# Patient Record
Sex: FEMALE | Race: ASIAN | NOT HISPANIC OR LATINO | URBAN - METROPOLITAN AREA
[De-identification: names, ages, dates, MRNs, and addresses within clinical notes are randomized per-mention and may not be internally consistent; named-entity substitution may affect disease eponyms.]

---

## 2023-06-04 ENCOUNTER — INPATIENT (INPATIENT)
Facility: HOSPITAL | Age: 43
LOS: 2 days | Discharge: LEFT BEFORE DISCHARGE | End: 2023-06-07
Attending: STUDENT IN AN ORGANIZED HEALTH CARE EDUCATION/TRAINING PROGRAM | Admitting: STUDENT IN AN ORGANIZED HEALTH CARE EDUCATION/TRAINING PROGRAM
Payer: COMMERCIAL

## 2023-06-04 VITALS
OXYGEN SATURATION: 100 % | DIASTOLIC BLOOD PRESSURE: 113 MMHG | TEMPERATURE: 98 F | SYSTOLIC BLOOD PRESSURE: 182 MMHG | HEART RATE: 89 BPM | RESPIRATION RATE: 16 BRPM

## 2023-06-04 DIAGNOSIS — Z29.9 ENCOUNTER FOR PROPHYLACTIC MEASURES, UNSPECIFIED: ICD-10-CM

## 2023-06-04 DIAGNOSIS — I82.409 ACUTE EMBOLISM AND THROMBOSIS OF UNSPECIFIED DEEP VEINS OF UNSPECIFIED LOWER EXTREMITY: ICD-10-CM

## 2023-06-04 DIAGNOSIS — I10 ESSENTIAL (PRIMARY) HYPERTENSION: ICD-10-CM

## 2023-06-04 DIAGNOSIS — R55 SYNCOPE AND COLLAPSE: ICD-10-CM

## 2023-06-04 DIAGNOSIS — E11.65 TYPE 2 DIABETES MELLITUS WITH HYPERGLYCEMIA: ICD-10-CM

## 2023-06-04 DIAGNOSIS — I51.81 TAKOTSUBO SYNDROME: ICD-10-CM

## 2023-06-04 DIAGNOSIS — R11.2 NAUSEA WITH VOMITING, UNSPECIFIED: ICD-10-CM

## 2023-06-04 LAB
A1C WITH ESTIMATED AVERAGE GLUCOSE RESULT: 12.8 % — HIGH (ref 4–5.6)
ALBUMIN SERPL ELPH-MCNC: 4.3 G/DL — SIGNIFICANT CHANGE UP (ref 3.3–5)
ALP SERPL-CCNC: 102 U/L — SIGNIFICANT CHANGE UP (ref 40–120)
ALT FLD-CCNC: 17 U/L — SIGNIFICANT CHANGE UP (ref 4–33)
ANION GAP SERPL CALC-SCNC: 14 MMOL/L — SIGNIFICANT CHANGE UP (ref 7–14)
APPEARANCE UR: CLEAR — SIGNIFICANT CHANGE UP
AST SERPL-CCNC: 19 U/L — SIGNIFICANT CHANGE UP (ref 4–32)
B-OH-BUTYR SERPL-SCNC: 0.5 MMOL/L — HIGH (ref 0–0.4)
BASOPHILS # BLD AUTO: 0.01 K/UL — SIGNIFICANT CHANGE UP (ref 0–0.2)
BASOPHILS NFR BLD AUTO: 0.1 % — SIGNIFICANT CHANGE UP (ref 0–2)
BILIRUB SERPL-MCNC: 0.4 MG/DL — SIGNIFICANT CHANGE UP (ref 0.2–1.2)
BILIRUB UR-MCNC: NEGATIVE — SIGNIFICANT CHANGE UP
BLOOD GAS VENOUS COMPREHENSIVE RESULT: SIGNIFICANT CHANGE UP
BUN SERPL-MCNC: 16 MG/DL — SIGNIFICANT CHANGE UP (ref 7–23)
CALCIUM SERPL-MCNC: 9.7 MG/DL — SIGNIFICANT CHANGE UP (ref 8.4–10.5)
CHLORIDE SERPL-SCNC: 96 MMOL/L — LOW (ref 98–107)
CO2 SERPL-SCNC: 23 MMOL/L — SIGNIFICANT CHANGE UP (ref 22–31)
COLOR SPEC: COLORLESS — SIGNIFICANT CHANGE UP
CREAT SERPL-MCNC: 0.53 MG/DL — SIGNIFICANT CHANGE UP (ref 0.5–1.3)
DIFF PNL FLD: NEGATIVE — SIGNIFICANT CHANGE UP
EGFR: 118 ML/MIN/1.73M2 — SIGNIFICANT CHANGE UP
EOSINOPHIL # BLD AUTO: 0.17 K/UL — SIGNIFICANT CHANGE UP (ref 0–0.5)
EOSINOPHIL NFR BLD AUTO: 2.3 % — SIGNIFICANT CHANGE UP (ref 0–6)
ESTIMATED AVERAGE GLUCOSE: 321 — SIGNIFICANT CHANGE UP
GLUCOSE BLDC GLUCOMTR-MCNC: 254 MG/DL — HIGH (ref 70–99)
GLUCOSE BLDC GLUCOMTR-MCNC: 262 MG/DL — HIGH (ref 70–99)
GLUCOSE BLDC GLUCOMTR-MCNC: 286 MG/DL — HIGH (ref 70–99)
GLUCOSE SERPL-MCNC: 380 MG/DL — HIGH (ref 70–99)
GLUCOSE UR QL: ABNORMAL
HCG SERPL-ACNC: <1 MIU/ML — SIGNIFICANT CHANGE UP
HCT VFR BLD CALC: 39.2 % — SIGNIFICANT CHANGE UP (ref 34.5–45)
HGB BLD-MCNC: 13.2 G/DL — SIGNIFICANT CHANGE UP (ref 11.5–15.5)
IANC: 4.9 K/UL — SIGNIFICANT CHANGE UP (ref 1.8–7.4)
IMM GRANULOCYTES NFR BLD AUTO: 0.3 % — SIGNIFICANT CHANGE UP (ref 0–0.9)
KETONES UR-MCNC: ABNORMAL
LEUKOCYTE ESTERASE UR-ACNC: NEGATIVE — SIGNIFICANT CHANGE UP
LYMPHOCYTES # BLD AUTO: 1.86 K/UL — SIGNIFICANT CHANGE UP (ref 1–3.3)
LYMPHOCYTES # BLD AUTO: 25 % — SIGNIFICANT CHANGE UP (ref 13–44)
MAGNESIUM SERPL-MCNC: 1.5 MG/DL — LOW (ref 1.6–2.6)
MCHC RBC-ENTMCNC: 28.4 PG — SIGNIFICANT CHANGE UP (ref 27–34)
MCHC RBC-ENTMCNC: 33.7 GM/DL — SIGNIFICANT CHANGE UP (ref 32–36)
MCV RBC AUTO: 84.3 FL — SIGNIFICANT CHANGE UP (ref 80–100)
MONOCYTES # BLD AUTO: 0.49 K/UL — SIGNIFICANT CHANGE UP (ref 0–0.9)
MONOCYTES NFR BLD AUTO: 6.6 % — SIGNIFICANT CHANGE UP (ref 2–14)
NEUTROPHILS # BLD AUTO: 4.9 K/UL — SIGNIFICANT CHANGE UP (ref 1.8–7.4)
NEUTROPHILS NFR BLD AUTO: 65.7 % — SIGNIFICANT CHANGE UP (ref 43–77)
NITRITE UR-MCNC: NEGATIVE — SIGNIFICANT CHANGE UP
NRBC # BLD: 0 /100 WBCS — SIGNIFICANT CHANGE UP (ref 0–0)
NRBC # FLD: 0 K/UL — SIGNIFICANT CHANGE UP (ref 0–0)
PH UR: 7.5 — SIGNIFICANT CHANGE UP (ref 5–8)
PHOSPHATE SERPL-MCNC: 2.7 MG/DL — SIGNIFICANT CHANGE UP (ref 2.5–4.5)
PLATELET # BLD AUTO: 267 K/UL — SIGNIFICANT CHANGE UP (ref 150–400)
POTASSIUM SERPL-MCNC: 4.6 MMOL/L — SIGNIFICANT CHANGE UP (ref 3.5–5.3)
POTASSIUM SERPL-SCNC: 4.6 MMOL/L — SIGNIFICANT CHANGE UP (ref 3.5–5.3)
PROT SERPL-MCNC: 8.3 G/DL — SIGNIFICANT CHANGE UP (ref 6–8.3)
PROT UR-MCNC: ABNORMAL
RAPID RVP RESULT: SIGNIFICANT CHANGE UP
RBC # BLD: 4.65 M/UL — SIGNIFICANT CHANGE UP (ref 3.8–5.2)
RBC # FLD: 12.3 % — SIGNIFICANT CHANGE UP (ref 10.3–14.5)
SARS-COV-2 RNA SPEC QL NAA+PROBE: SIGNIFICANT CHANGE UP
SODIUM SERPL-SCNC: 133 MMOL/L — LOW (ref 135–145)
SP GR SPEC: 1.02 — SIGNIFICANT CHANGE UP (ref 1.01–1.05)
TROPONIN T, HIGH SENSITIVITY RESULT: <6 NG/L — SIGNIFICANT CHANGE UP
UROBILINOGEN FLD QL: SIGNIFICANT CHANGE UP
WBC # BLD: 7.45 K/UL — SIGNIFICANT CHANGE UP (ref 3.8–10.5)
WBC # FLD AUTO: 7.45 K/UL — SIGNIFICANT CHANGE UP (ref 3.8–10.5)

## 2023-06-04 PROCEDURE — 99285 EMERGENCY DEPT VISIT HI MDM: CPT

## 2023-06-04 PROCEDURE — 71045 X-RAY EXAM CHEST 1 VIEW: CPT | Mod: 26

## 2023-06-04 PROCEDURE — 99223 1ST HOSP IP/OBS HIGH 75: CPT | Mod: GC

## 2023-06-04 RX ORDER — INSULIN LISPRO 100/ML
VIAL (ML) SUBCUTANEOUS
Refills: 0 | Status: DISCONTINUED | OUTPATIENT
Start: 2023-06-04 | End: 2023-06-07

## 2023-06-04 RX ORDER — DEXTROSE 50 % IN WATER 50 %
25 SYRINGE (ML) INTRAVENOUS ONCE
Refills: 0 | Status: DISCONTINUED | OUTPATIENT
Start: 2023-06-04 | End: 2023-06-07

## 2023-06-04 RX ORDER — INSULIN LISPRO 100/ML
VIAL (ML) SUBCUTANEOUS AT BEDTIME
Refills: 0 | Status: DISCONTINUED | OUTPATIENT
Start: 2023-06-04 | End: 2023-06-07

## 2023-06-04 RX ORDER — FAMOTIDINE 10 MG/ML
20 INJECTION INTRAVENOUS
Refills: 0 | Status: DISCONTINUED | OUTPATIENT
Start: 2023-06-04 | End: 2023-06-07

## 2023-06-04 RX ORDER — LOSARTAN POTASSIUM 100 MG/1
50 TABLET, FILM COATED ORAL DAILY
Refills: 0 | Status: DISCONTINUED | OUTPATIENT
Start: 2023-06-04 | End: 2023-06-05

## 2023-06-04 RX ORDER — DEXTROSE 50 % IN WATER 50 %
15 SYRINGE (ML) INTRAVENOUS ONCE
Refills: 0 | Status: DISCONTINUED | OUTPATIENT
Start: 2023-06-04 | End: 2023-06-07

## 2023-06-04 RX ORDER — SODIUM CHLORIDE 9 MG/ML
1000 INJECTION, SOLUTION INTRAVENOUS
Refills: 0 | Status: DISCONTINUED | OUTPATIENT
Start: 2023-06-04 | End: 2023-06-07

## 2023-06-04 RX ORDER — INSULIN GLARGINE 100 [IU]/ML
10 INJECTION, SOLUTION SUBCUTANEOUS AT BEDTIME
Refills: 0 | Status: DISCONTINUED | OUTPATIENT
Start: 2023-06-04 | End: 2023-06-05

## 2023-06-04 RX ORDER — SODIUM CHLORIDE 9 MG/ML
1000 INJECTION, SOLUTION INTRAVENOUS ONCE
Refills: 0 | Status: COMPLETED | OUTPATIENT
Start: 2023-06-04 | End: 2023-06-04

## 2023-06-04 RX ORDER — INSULIN LISPRO 100/ML
3 VIAL (ML) SUBCUTANEOUS
Refills: 0 | Status: DISCONTINUED | OUTPATIENT
Start: 2023-06-04 | End: 2023-06-05

## 2023-06-04 RX ORDER — METOCLOPRAMIDE HCL 10 MG
10 TABLET ORAL ONCE
Refills: 0 | Status: COMPLETED | OUTPATIENT
Start: 2023-06-04 | End: 2023-06-04

## 2023-06-04 RX ORDER — ACETAMINOPHEN 500 MG
650 TABLET ORAL EVERY 6 HOURS
Refills: 0 | Status: DISCONTINUED | OUTPATIENT
Start: 2023-06-04 | End: 2023-06-07

## 2023-06-04 RX ORDER — ONDANSETRON 8 MG/1
4 TABLET, FILM COATED ORAL EVERY 8 HOURS
Refills: 0 | Status: DISCONTINUED | OUTPATIENT
Start: 2023-06-04 | End: 2023-06-07

## 2023-06-04 RX ORDER — DEXTROSE 50 % IN WATER 50 %
12.5 SYRINGE (ML) INTRAVENOUS ONCE
Refills: 0 | Status: DISCONTINUED | OUTPATIENT
Start: 2023-06-04 | End: 2023-06-07

## 2023-06-04 RX ORDER — MAGNESIUM SULFATE 500 MG/ML
1 VIAL (ML) INJECTION ONCE
Refills: 0 | Status: COMPLETED | OUTPATIENT
Start: 2023-06-04 | End: 2023-06-04

## 2023-06-04 RX ORDER — ONDANSETRON 8 MG/1
4 TABLET, FILM COATED ORAL ONCE
Refills: 0 | Status: COMPLETED | OUTPATIENT
Start: 2023-06-04 | End: 2023-06-04

## 2023-06-04 RX ORDER — HEPARIN SODIUM 5000 [USP'U]/ML
5000 INJECTION INTRAVENOUS; SUBCUTANEOUS EVERY 8 HOURS
Refills: 0 | Status: DISCONTINUED | OUTPATIENT
Start: 2023-06-04 | End: 2023-06-07

## 2023-06-04 RX ORDER — GLUCAGON INJECTION, SOLUTION 0.5 MG/.1ML
1 INJECTION, SOLUTION SUBCUTANEOUS ONCE
Refills: 0 | Status: DISCONTINUED | OUTPATIENT
Start: 2023-06-04 | End: 2023-06-07

## 2023-06-04 RX ORDER — SODIUM CHLORIDE 9 MG/ML
1000 INJECTION, SOLUTION INTRAVENOUS
Refills: 0 | Status: DISCONTINUED | OUTPATIENT
Start: 2023-06-04 | End: 2023-06-05

## 2023-06-04 RX ORDER — LANOLIN ALCOHOL/MO/W.PET/CERES
3 CREAM (GRAM) TOPICAL AT BEDTIME
Refills: 0 | Status: DISCONTINUED | OUTPATIENT
Start: 2023-06-04 | End: 2023-06-07

## 2023-06-04 RX ORDER — FAMOTIDINE 10 MG/ML
20 INJECTION INTRAVENOUS ONCE
Refills: 0 | Status: COMPLETED | OUTPATIENT
Start: 2023-06-04 | End: 2023-06-04

## 2023-06-04 RX ADMIN — SODIUM CHLORIDE 1000 MILLILITER(S): 9 INJECTION, SOLUTION INTRAVENOUS at 16:37

## 2023-06-04 RX ADMIN — Medication 1: at 22:49

## 2023-06-04 RX ADMIN — ONDANSETRON 4 MILLIGRAM(S): 8 TABLET, FILM COATED ORAL at 10:29

## 2023-06-04 RX ADMIN — SODIUM CHLORIDE 1000 MILLILITER(S): 9 INJECTION, SOLUTION INTRAVENOUS at 10:29

## 2023-06-04 RX ADMIN — Medication 1 GRAM(S): at 16:37

## 2023-06-04 RX ADMIN — LOSARTAN POTASSIUM 50 MILLIGRAM(S): 100 TABLET, FILM COATED ORAL at 18:23

## 2023-06-04 RX ADMIN — INSULIN GLARGINE 10 UNIT(S): 100 INJECTION, SOLUTION SUBCUTANEOUS at 22:48

## 2023-06-04 RX ADMIN — ONDANSETRON 4 MILLIGRAM(S): 8 TABLET, FILM COATED ORAL at 20:48

## 2023-06-04 RX ADMIN — SODIUM CHLORIDE 1000 MILLILITER(S): 9 INJECTION, SOLUTION INTRAVENOUS at 11:51

## 2023-06-04 RX ADMIN — Medication 100 GRAM(S): at 11:52

## 2023-06-04 RX ADMIN — Medication 10 MILLIGRAM(S): at 11:51

## 2023-06-04 RX ADMIN — SODIUM CHLORIDE 75 MILLILITER(S): 9 INJECTION, SOLUTION INTRAVENOUS at 22:49

## 2023-06-04 RX ADMIN — HEPARIN SODIUM 5000 UNIT(S): 5000 INJECTION INTRAVENOUS; SUBCUTANEOUS at 22:48

## 2023-06-04 RX ADMIN — FAMOTIDINE 20 MILLIGRAM(S): 10 INJECTION INTRAVENOUS at 11:52

## 2023-06-04 RX ADMIN — Medication 3: at 16:23

## 2023-06-04 NOTE — H&P ADULT - NSHPLABSRESULTS_GEN_ALL_CORE
.  LABS:                         13.2   7.45  )-----------( 267      ( 2023 10:38 )             39.2     06-04    133<L>  |  96<L>  |  16  ----------------------------<  380<H>  4.6   |  23  |  0.53    Ca    9.7      2023 10:38  Phos  2.7     06-04  Mg     1.50     06-04    TPro  8.3  /  Alb  4.3  /  TBili  0.4  /  DBili  x   /  AST  19  /  ALT  17  /  AlkPhos  102  06-04      Urinalysis Basic - ( 2023 12:27 )    Color: Colorless / Appearance: Clear / S.017 / pH: x  Gluc: x / Ketone: Small  / Bili: Negative / Urobili: <2 mg/dL   Blood: x / Protein: 100 mg/dL / Nitrite: Negative   Leuk Esterase: Negative / RBC: 2 /HPF / WBC 2 /HPF   Sq Epi: x / Non Sq Epi: x / Bacteria: Negative            RADIOLOGY, EKG & ADDITIONAL TESTS: Reviewed.

## 2023-06-04 NOTE — H&P ADULT - NSHPREVIEWOFSYSTEMS_GEN_ALL_CORE
REVIEW OF SYSTEMS:    CONSTITUTIONAL: +weakness, no fevers or chills  EYES/ENT: No visual changes;  No vertigo or throat pain   NECK: No pain or stiffness  RESPIRATORY: No cough, wheezing, hemoptysis; No shortness of breath  CARDIOVASCULAR: No chest pain or palpitations  GASTROINTESTINAL: +epigastric pain, n, n. No hematemesis; +diarrhea,  No melena or hematochezia.  GENITOURINARY: No dysuria or hematuria, +frequency   NEUROLOGICAL: No numbness or weakness  SKIN: No itching, burning, rashes, or lesions

## 2023-06-04 NOTE — ED ADULT NURSE REASSESSMENT NOTE - NS ED NURSE REASSESS COMMENT FT1
report given to ESSU1 patient stable, no complains of pain or discomfort.  Pt denies any SOB, Chest pain, headache lightheadedness, dizziness, vomiting, diarrhea. Safety maintained.
BREAK RN: pt. remains A&Ox4, awake and resting. pt. endorsing continuous nausea a/w diarrhea. MD Autumn at bedside for eval. no acute distress noted. respirations even and unlabored. NSR on cardiac monitor. VS as noted.

## 2023-06-04 NOTE — H&P ADULT - ASSESSMENT
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

## 2023-06-04 NOTE — H&P ADULT - NSHPPHYSICALEXAM_GEN_ALL_CORE
VITALS:   T(C): 37.1 (06-04-23 @ 14:03), Max: 37.1 (06-04-23 @ 14:03)  HR: 99 (06-04-23 @ 14:03) (88 - 99)  BP: 182/113 (06-04-23 @ 14:03) (167/102 - 182/113)  RR: 18 (06-04-23 @ 14:03) (16 - 18)  SpO2: 100% (06-04-23 @ 14:03) (100% - 100%)    GENERAL: NAD, lying in bed, appears tired  HEAD:  Atraumatic, Normocephalic  EYES: EOMI, PERRLA, conjunctiva and sclera clear  ENT: Moist mucous membranes  NECK: Supple, No JVD  CHEST/LUNG: Clear to auscultation bilaterally; No rales, rhonchi, wheezing, or rubs. Unlabored respirations  HEART: Regular rate and rhythm; No murmurs, rubs, or gallops  ABDOMEN: BSx4; Soft, nondistended, +epigastric tenderness  EXTREMITIES:  2+ Peripheral Pulses, brisk capillary refill. No clubbing, cyanosis, or edema  NERVOUS SYSTEM:  A&Ox3, no focal deficits   SKIN: No rashes or lesions  psych: normal behavior, normal affect

## 2023-06-04 NOTE — ED PROVIDER NOTE - CLINICAL SUMMARY MEDICAL DECISION MAKING FREE TEXT BOX
Already 3-year-old female presenting to the ED with nausea vomiting and dizziness that started approximately 1 hour prior to ED arrival in setting of history of diabetes nonadherent to insulin regimen.  On exam appears uncomfortable secondary to nausea denying any current pain, unlabored breathing, clear lungs, abdomen soft and nontender, no rash, all extremities warm and well-perfused.  Does have dry mucous membranes.  Plan for labs, hydration, symptomatic treatment, EKG, evaluate for differential including but not limited to DKA, electrolyte disturbance, organ dysfunction, infection.  Patient and  at bedside informed and are agreeable to plan.

## 2023-06-04 NOTE — H&P ADULT - ATTENDING COMMENTS
43F admitted with n/v and uncontrolled DM2 with hyperglycemia, no DKA  --basal/bolus insulin  --endocrine consult  --zofran for nausea  --advance diet as tolerated  --agree TTE given history syncope and reported hx takotsubo cardiomyopathy

## 2023-06-04 NOTE — ED PROVIDER NOTE - PHYSICAL EXAMINATION
GEN - awake, alert, appears uncomfortable 2/2 reported nausea  HEAD - NC/AT   EYES- PERRL, EOMI  ENT: Airway patent, dry mm, Oral cavity and pharynx normal. No inflammation, swelling, exudate, or lesions.    NECK: Neck supple  PULMONARY - CTA b/l, symmetric breath sounds.   CARDIAC -s1s2, RRR, no M,G,R  ABDOMEN - +BS, ND, NT, soft, no guarding  BACK - no CVA tenderness  EXTREMITIES - FROM, symmetric pulses, capillary refill < 2 seconds, no edema   SKIN - no rash or bruising   NEUROLOGIC - alert, speech clear, no focal deficits

## 2023-06-04 NOTE — ED PROVIDER NOTE - PROGRESS NOTE DETAILS
patient now reporting she had loose watery nonbloody stool in ed. no abd pain, no further vomiting at this time though still nauseated and unable to addie po Yonatan Leyva PGY3: Pt still nauseated and vomiting. Abd pain improved. Will admit for further management. Pt agreeable to plan and pt accepted for admission.

## 2023-06-04 NOTE — ED ADULT TRIAGE NOTE - CHIEF COMPLAINT QUOTE
dizziness x 1 hour, family states feels like this when blood sugar is elevated, , noted to be hypertensive

## 2023-06-04 NOTE — H&P ADULT - PROBLEM SELECTOR PLAN 3
- hx of DVT, PE when she was 17 s/p IVC filter and 2 miscarriages  - hypercoagulatiy workup including APLS  - SQH for now

## 2023-06-04 NOTE — ED ADULT NURSE NOTE - HISTORY OF COVID-19 VACCINATION
Follow up: Dermatitis: anal.    The patient was offered a chaperone declines.    Accompanied by: unaccompanied                            Vaccine status unknown

## 2023-06-04 NOTE — ED ADULT NURSE NOTE - NSFALLHARMRISKINTERV_ED_ALL_ED
Assistance with ambulation/Communicate risk of Fall with Harm to all staff, patient, and family/Provide visual cue: red socks, yellow wristband, yellow gown, etc/Reinforce activity limits and safety measures with patient and family/Use of alarms - bed, stretcher, chair and/or video monitoring/Bed in lowest position, wheels locked, appropriate side rails in place/Call bell, personal items and telephone in reach/Instruct patient to call for assistance before getting out of bed/chair/stretcher/Non-slip footwear applied when patient is off stretcher/[place] to call system/Physically safe environment - no spills, clutter or unnecessary equipment/Purposeful Proactive Rounding/Room/bathroom lighting operational, light cord in reach

## 2023-06-04 NOTE — ED ADULT NURSE NOTE - NS ED NURSE RECORD ANOTHER HT AND WT
-- DO NOT REPLY / DO NOT REPLY ALL --  -- Message is from the [facility]--    Patient is requesting a medication refill - medication is on active medication list    Patient is currently OUT of the requested medication.    Was Medication Pended?  Yes.    Rx Name and Dose:  Tessalon perles 100mg and clindamycin 300      Duration: 30 days    Pharmacy  [id number] [address]    Patient confirmed the above pharmacy as correct?  Yes    Caller Information       Type Contact Phone    12/29/2019 03:53 PM Phone (Incoming) CARLOS LABRYCEPIERCE (Father) [phone]          Alternative phone number: none    Turnaround time given to caller:   \"Your doctor's office is closed. This message will be sent to our nurse. They will return your call as soon as they review your message. (Calls after 10 PM will be returned tomorrow morning.)\"   No

## 2023-06-04 NOTE — H&P ADULT - PROBLEM SELECTOR PLAN 1
Hx of T2DM, uncontrolled with hyperglycemia (states usually reads in the 300s)  Was offered an insulin pump in the past but declined  - unclear why she wasn't offered oral regimen in the past- she was just offered metformin but got upset stomach and never offered anything else   - POCG 300s on admission, no AG, BHB 0.5, small urinary ketones, normal pH- not in DKA  - HCG negative  - on Novolog 3U TID at home, not on long acting, not on oral, reports insurance hurdles as well  - 2/p 2L LR bolus    Plan:   Insulin Basal/Bolus regimen at .3 of body weight  Zofran/pepcid  LR maintenance fluids  Endo consult for further management  BMP, VBG Q12H for now

## 2023-06-04 NOTE — ED ADULT NURSE NOTE - OBJECTIVE STATEMENT
Received pt in bed A and Ox3 c.o severe nausea and vomiting, reports unable to provide any further information due to severity of symptoms, PERRLA extremities are strong and equal, abd soft non distended non tender, skin color appropriate for ethnicity, well nourished and hydrated, IV initiated labs drawn and sent.

## 2023-06-04 NOTE — ED PROVIDER NOTE - OBJECTIVE STATEMENT
43-year-old female presenting to the ED with nausea vomiting and dizziness.  Patient is visiting from [place], reports that she has diabetes and does not typically take her insulin for fear of the injection.  She reports she has had history of episodes of having vomiting and dizziness associated with hyperglycemia which required hospitalizations in the past, last was in April.  She reports she started to feel unwell today approximately 1 hour prior to arrival with multiple episodes of vomiting and dizziness nonbloody nonbilious.  Had some mild epigastric abdominal discomfort at the time which has since resolved though nausea has remained.  Does not have any fevers chest pain shortness of breath diarrhea dysuria, no unilateral numbness/tingling/weakness, no vision change/neck stiffness.  Does report polyuria.  Reports has been offered insulin pump in the past however has declined it due to concerns regarding privacy relating to a pump. She also has h/o blood clot when she was younger, no longer on ac, no recent edema or leg pain

## 2023-06-04 NOTE — H&P ADULT - HISTORY OF PRESENT ILLNESS
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  Patient is visiting from [place], reports that she has diabetes and does not typically take her insulin for fear of the injection.  She reports she has had history of episodes of having vomiting and dizziness associated with hyperglycemia which required hospitalizations in the past, last was in April.  She reports she started to feel unwell today approximately 1 hour prior to arrival with multiple episodes of vomiting and dizziness nonbloody nonbilious.  Had some mild epigastric abdominal discomfort at the time which has since resolved though nausea has remained.  Patient also complains of burning in her stomach.     Patient reports episodes of syncope and passing out, associated with urinary/bowel incontinence, and whole body tremors/shaking and LOC, but no post-ictal confusion. She also reports episodes of hypotension.     LMP 5/18, though has PCOS history. Has 2 sons, and reports 2 miscarriages.     In the ED, patient received 2L LR bolus, 3U Lispro, ISS, Mag, Reglan, and Zofran.

## 2023-06-05 DIAGNOSIS — E78.49 OTHER HYPERLIPIDEMIA: ICD-10-CM

## 2023-06-05 DIAGNOSIS — E11.65 TYPE 2 DIABETES MELLITUS WITH HYPERGLYCEMIA: ICD-10-CM

## 2023-06-05 LAB
ALBUMIN SERPL ELPH-MCNC: 4.4 G/DL — SIGNIFICANT CHANGE UP (ref 3.3–5)
ALP SERPL-CCNC: 96 U/L — SIGNIFICANT CHANGE UP (ref 40–120)
ALT FLD-CCNC: 14 U/L — SIGNIFICANT CHANGE UP (ref 4–33)
ANION GAP SERPL CALC-SCNC: 15 MMOL/L — HIGH (ref 7–14)
APTT BLD: 24.4 SEC — LOW (ref 27–36.3)
AST SERPL-CCNC: 13 U/L — SIGNIFICANT CHANGE UP (ref 4–32)
B-OH-BUTYR SERPL-SCNC: 1 MMOL/L — HIGH (ref 0–0.4)
BILIRUB SERPL-MCNC: 0.6 MG/DL — SIGNIFICANT CHANGE UP (ref 0.2–1.2)
BUN SERPL-MCNC: 15 MG/DL — SIGNIFICANT CHANGE UP (ref 7–23)
C PEPTIDE SERPL-MCNC: 1.3 NG/ML — SIGNIFICANT CHANGE UP (ref 1.1–4.4)
CALCIUM SERPL-MCNC: 9.5 MG/DL — SIGNIFICANT CHANGE UP (ref 8.4–10.5)
CHLORIDE SERPL-SCNC: 90 MMOL/L — LOW (ref 98–107)
CHOLEST SERPL-MCNC: 262 MG/DL — HIGH
CO2 SERPL-SCNC: 23 MMOL/L — SIGNIFICANT CHANGE UP (ref 22–31)
CREAT SERPL-MCNC: 0.47 MG/DL — LOW (ref 0.5–1.3)
EGFR: 122 ML/MIN/1.73M2 — SIGNIFICANT CHANGE UP
GLUCOSE BLDC GLUCOMTR-MCNC: 109 MG/DL — HIGH (ref 70–99)
GLUCOSE BLDC GLUCOMTR-MCNC: 137 MG/DL — HIGH (ref 70–99)
GLUCOSE BLDC GLUCOMTR-MCNC: 187 MG/DL — HIGH (ref 70–99)
GLUCOSE BLDC GLUCOMTR-MCNC: 206 MG/DL — HIGH (ref 70–99)
GLUCOSE BLDC GLUCOMTR-MCNC: 216 MG/DL — HIGH (ref 70–99)
GLUCOSE BLDC GLUCOMTR-MCNC: 225 MG/DL — HIGH (ref 70–99)
GLUCOSE BLDC GLUCOMTR-MCNC: 243 MG/DL — HIGH (ref 70–99)
GLUCOSE SERPL-MCNC: 245 MG/DL — HIGH (ref 70–99)
HCT VFR BLD CALC: 40.5 % — SIGNIFICANT CHANGE UP (ref 34.5–45)
HDLC SERPL-MCNC: 81 MG/DL — SIGNIFICANT CHANGE UP
HGB BLD-MCNC: 14.2 G/DL — SIGNIFICANT CHANGE UP (ref 11.5–15.5)
INR BLD: 1.15 RATIO — SIGNIFICANT CHANGE UP (ref 0.88–1.16)
LIPID PNL WITH DIRECT LDL SERPL: 166 MG/DL — HIGH
MAGNESIUM SERPL-MCNC: 1.5 MG/DL — LOW (ref 1.6–2.6)
MCHC RBC-ENTMCNC: 28.7 PG — SIGNIFICANT CHANGE UP (ref 27–34)
MCHC RBC-ENTMCNC: 35.1 GM/DL — SIGNIFICANT CHANGE UP (ref 32–36)
MCV RBC AUTO: 82 FL — SIGNIFICANT CHANGE UP (ref 80–100)
NON HDL CHOLESTEROL: 181 MG/DL — HIGH
NRBC # BLD: 0 /100 WBCS — SIGNIFICANT CHANGE UP (ref 0–0)
NRBC # FLD: 0 K/UL — SIGNIFICANT CHANGE UP (ref 0–0)
PHOSPHATE SERPL-MCNC: 2.8 MG/DL — SIGNIFICANT CHANGE UP (ref 2.5–4.5)
PLATELET # BLD AUTO: 315 K/UL — SIGNIFICANT CHANGE UP (ref 150–400)
POTASSIUM SERPL-MCNC: 3.9 MMOL/L — SIGNIFICANT CHANGE UP (ref 3.5–5.3)
POTASSIUM SERPL-SCNC: 3.9 MMOL/L — SIGNIFICANT CHANGE UP (ref 3.5–5.3)
PROT SERPL-MCNC: 8 G/DL — SIGNIFICANT CHANGE UP (ref 6–8.3)
PROTHROM AB SERPL-ACNC: 13.4 SEC — SIGNIFICANT CHANGE UP (ref 10.5–13.4)
RBC # BLD: 4.94 M/UL — SIGNIFICANT CHANGE UP (ref 3.8–5.2)
RBC # FLD: 12.1 % — SIGNIFICANT CHANGE UP (ref 10.3–14.5)
SODIUM SERPL-SCNC: 128 MMOL/L — LOW (ref 135–145)
TRIGL SERPL-MCNC: 74 MG/DL — SIGNIFICANT CHANGE UP
WBC # BLD: 8.21 K/UL — SIGNIFICANT CHANGE UP (ref 3.8–10.5)
WBC # FLD AUTO: 8.21 K/UL — SIGNIFICANT CHANGE UP (ref 3.8–10.5)

## 2023-06-05 PROCEDURE — 93306 TTE W/DOPPLER COMPLETE: CPT | Mod: 26

## 2023-06-05 PROCEDURE — 99232 SBSQ HOSP IP/OBS MODERATE 35: CPT | Mod: GC

## 2023-06-05 PROCEDURE — 93970 EXTREMITY STUDY: CPT | Mod: 26

## 2023-06-05 PROCEDURE — 99255 IP/OBS CONSLTJ NEW/EST HI 80: CPT

## 2023-06-05 RX ORDER — INSULIN GLARGINE 100 [IU]/ML
14 INJECTION, SOLUTION SUBCUTANEOUS AT BEDTIME
Refills: 0 | Status: DISCONTINUED | OUTPATIENT
Start: 2023-06-05 | End: 2023-06-07

## 2023-06-05 RX ORDER — SODIUM CHLORIDE 9 MG/ML
1000 INJECTION, SOLUTION INTRAVENOUS
Refills: 0 | Status: DISCONTINUED | OUTPATIENT
Start: 2023-06-05 | End: 2023-06-06

## 2023-06-05 RX ORDER — LOSARTAN POTASSIUM 100 MG/1
100 TABLET, FILM COATED ORAL DAILY
Refills: 0 | Status: DISCONTINUED | OUTPATIENT
Start: 2023-06-06 | End: 2023-06-07

## 2023-06-05 RX ORDER — INSULIN LISPRO 100/ML
5 VIAL (ML) SUBCUTANEOUS
Refills: 0 | Status: DISCONTINUED | OUTPATIENT
Start: 2023-06-05 | End: 2023-06-06

## 2023-06-05 RX ORDER — METOCLOPRAMIDE HCL 10 MG
5 TABLET ORAL ONCE
Refills: 0 | Status: COMPLETED | OUTPATIENT
Start: 2023-06-05 | End: 2023-06-05

## 2023-06-05 RX ORDER — ATORVASTATIN CALCIUM 80 MG/1
40 TABLET, FILM COATED ORAL AT BEDTIME
Refills: 0 | Status: DISCONTINUED | OUTPATIENT
Start: 2023-06-05 | End: 2023-06-07

## 2023-06-05 RX ORDER — MAGNESIUM SULFATE 500 MG/ML
2 VIAL (ML) INJECTION ONCE
Refills: 0 | Status: COMPLETED | OUTPATIENT
Start: 2023-06-05 | End: 2023-06-05

## 2023-06-05 RX ADMIN — Medication 25 GRAM(S): at 13:56

## 2023-06-05 RX ADMIN — Medication 1: at 17:57

## 2023-06-05 RX ADMIN — Medication 650 MILLIGRAM(S): at 17:30

## 2023-06-05 RX ADMIN — FAMOTIDINE 20 MILLIGRAM(S): 10 INJECTION INTRAVENOUS at 07:26

## 2023-06-05 RX ADMIN — HEPARIN SODIUM 5000 UNIT(S): 5000 INJECTION INTRAVENOUS; SUBCUTANEOUS at 13:55

## 2023-06-05 RX ADMIN — FAMOTIDINE 20 MILLIGRAM(S): 10 INJECTION INTRAVENOUS at 17:30

## 2023-06-05 RX ADMIN — Medication 5 UNIT(S): at 17:58

## 2023-06-05 RX ADMIN — INSULIN GLARGINE 14 UNIT(S): 100 INJECTION, SOLUTION SUBCUTANEOUS at 21:51

## 2023-06-05 RX ADMIN — Medication 2: at 13:41

## 2023-06-05 RX ADMIN — SODIUM CHLORIDE 75 MILLILITER(S): 9 INJECTION, SOLUTION INTRAVENOUS at 21:52

## 2023-06-05 RX ADMIN — ATORVASTATIN CALCIUM 40 MILLIGRAM(S): 80 TABLET, FILM COATED ORAL at 21:51

## 2023-06-05 RX ADMIN — Medication 5 UNIT(S): at 13:42

## 2023-06-05 RX ADMIN — LOSARTAN POTASSIUM 50 MILLIGRAM(S): 100 TABLET, FILM COATED ORAL at 07:26

## 2023-06-05 RX ADMIN — HEPARIN SODIUM 5000 UNIT(S): 5000 INJECTION INTRAVENOUS; SUBCUTANEOUS at 21:51

## 2023-06-05 RX ADMIN — Medication 2: at 08:53

## 2023-06-05 RX ADMIN — SODIUM CHLORIDE 75 MILLILITER(S): 9 INJECTION, SOLUTION INTRAVENOUS at 16:24

## 2023-06-05 RX ADMIN — Medication 3 UNIT(S): at 08:54

## 2023-06-05 RX ADMIN — Medication 5 MILLIGRAM(S): at 17:30

## 2023-06-05 RX ADMIN — HEPARIN SODIUM 5000 UNIT(S): 5000 INJECTION INTRAVENOUS; SUBCUTANEOUS at 07:26

## 2023-06-05 RX ADMIN — ONDANSETRON 4 MILLIGRAM(S): 8 TABLET, FILM COATED ORAL at 11:27

## 2023-06-05 NOTE — PROGRESS NOTE ADULT - PROBLEM SELECTOR PLAN 3
- hx of DVT, PE when she was 17 s/p IVC filter and 2 miscarriages  - hypercoagulatiy workup including APLS  - SQH for now - hx of DVT, PE when she was 17 s/p IVC filter and 2 miscarriages  - hx of DVT within past 5 years  - hypercoagulatiy workup including APLS  - SQH for now  - [facility] Duplex

## 2023-06-05 NOTE — PROGRESS NOTE ADULT - TIME BILLING
Review of laboratory data, radiology results, consultants' recommendations, documentation in [place], discussion with patient/house staff and interdisciplinary staff (such as , social workers, etc). Interventions were performed as documented above.

## 2023-06-05 NOTE — PATIENT PROFILE ADULT - FALL HARM RISK - HARM RISK INTERVENTIONS

## 2023-06-05 NOTE — PROGRESS NOTE ADULT - PROBLEM SELECTOR PLAN 5
HTN to 180s on admission   Losartan, home med  further BP control as needed HTN to 180s on admission   Losartan, home med  further BP control as needed--> prefer to increase Losartan dose as needed

## 2023-06-05 NOTE — PROGRESS NOTE ADULT - PROBLEM SELECTOR PLAN 4
- reported hx of Takotsubo   - TTE Echo  - more collateral info - reported hx of Takotsubo, hospitalized with reported EF 35%   - TTE Echo

## 2023-06-05 NOTE — PATIENT PROFILE ADULT - CENTRAL VENOUS CATHETER/PICC LINE
no Detail Level: Detailed Quality 226: Preventive Care And Screening: Tobacco Use: Screening And Cessation Intervention: Patient screened for tobacco use and is an ex/non-smoker Quality 110: Preventive Care And Screening: Influenza Immunization: Influenza Immunization Administered during Influenza season Quality 431: Preventive Care And Screening: Unhealthy Alcohol Use - Screening: Patient screened for unhealthy alcohol use using a single question and scores less than 2 times per year Quality 111:Pneumonia Vaccination Status For Older Adults: Pneumococcal Vaccination Previously Received Quality 130: Documentation Of Current Medications In The Medical Record: Current Medications Documented

## 2023-06-05 NOTE — PROGRESS NOTE ADULT - PROBLEM SELECTOR PLAN 2
reported hx of syncope, unwilling to provide more information due to not feeling well, reports bladder/bowel incontinence and LOC though no confusion  last syncope episode reported 4 months prior   ECG- QTC ok  CTM reported hx of syncope, unwilling to provide more information due to not feeling well, reports bladder/bowel incontinence and LOC though no confusion  last syncope episode reported 4 months prior   ECG- QTC ok  - TTE Echo   CTM

## 2023-06-05 NOTE — CONSULT NOTE ADULT - SUBJECTIVE AND OBJECTIVE BOX
HPI:  Please note: History was obtained subjectively, as patient's partner works in , and records primarily at [place] (not [facility] affiliated)  43 year old female with past history of T2DM, HTN, DVT/PE s/p IVC filter, PCOS, takotsubo cardiomyopathy, currently visiting from [place], presents with 1 day of feeling generally unwell with nausea, vomiting, dizziness, epigastric discomfort, weakness and increased frequency. She denies any infective symptoms or fevers. She reports poor oral intake yesterday due to her symptoms. She vomited 10x yesterday, non bloody, non bilious vomits. This is on the background of being diagnosed recently with gastroparesis (based on the patient describes as likely results of a barium swallow) in April and is currently awaiting GI appointment. She is currently not on any regular medication for the gastroparesis. She describes the GI symptoms to occur infrequently, and she denies any significant weight loss. On admission her BG was 420, labs not consistent with DKA (pH 7.34, BHB 1, HCO3 30, AG normal. HbA1c 12.8%. Given 10 units of lantus last night and ademlog 3 units with meals. She also was hypertensive /86 and had elevated LDL and triglycerides. She is currently on losartan 50mg daily, which she reports taking most days, and is not on any statin therapy. Endocrine consulted for hyperglycemia management.     Endocrine history  Patient reports being diagnosed with T2DM in her late 20’s. She was initially commenced on metformin, but due to GI side effects was unable to tolerate even at low doses. She has sine been prescribed basal bolus, which she reports to being noncompliant with over the past 6 months due to increasing fear of giving herself injections. She decided to stop taking her basal insulin months ago, and in the past few months has completely stopped taking all insulin. She not been checking her BGs during this period. She has not seen an endocrinologist for years, but does recall that there were discussion regarding using a pump in the past, but she was not open to this at the time of discussion due to not wanting to have a device attached to her at all times. She does have microvascular complications including early onset glaucoma, potentially requiring surgery, she recalls being told she has protein in her urine, but denies neuropathy. She has no history of macrovascular complications to date. She has evidence of autonomic dysfunction – likely gastroparesis and possible orthostatic hypotension – describes can get dizzy when getting up quickly     Endocrinologist: nil  HbA1c 12.8%  Smoke: denies  ETOH: denies  Diet: oatmeal, eggs, sausages, tea +equal, salads, soups, rice, meat and vegetables   Work: teacher   Menstrual cycles: irregular, misses every few months   Contraception: barrier, previous on OCP – stopped due to history of DVT/PE  Family history: cancer, lupus in cousins       PAST MEDICAL & SURGICAL HISTORY:  Diabetes    Social History:        MEDICATIONS  (STANDING):  atorvastatin 40 milliGRAM(s) Oral at bedtime  dextrose 5%. 1000 milliLiter(s) (50 mL/Hr) IV Continuous <Continuous>  dextrose 5%. 1000 milliLiter(s) (100 mL/Hr) IV Continuous <Continuous>  dextrose 50% Injectable 25 Gram(s) IV Push once  dextrose 50% Injectable 12.5 Gram(s) IV Push once  dextrose 50% Injectable 25 Gram(s) IV Push once  famotidine    Tablet 20 milliGRAM(s) Oral two times a day  glucagon  Injectable 1 milliGRAM(s) IntraMuscular once  heparin   Injectable 5000 Unit(s) SubCutaneous every 8 hours  insulin glargine Injectable (LANTUS) 14 Unit(s) SubCutaneous at bedtime  insulin lispro (ADMELOG) corrective regimen sliding scale   SubCutaneous at bedtime  insulin lispro (ADMELOG) corrective regimen sliding scale   SubCutaneous three times a day before meals  insulin lispro Injectable (ADMELOG) 5 Unit(s) SubCutaneous three times a day before meals  lactated ringers. 1000 milliLiter(s) (75 mL/Hr) IV Continuous <Continuous>  losartan 50 milliGRAM(s) Oral daily  magnesium sulfate  IVPB 2 Gram(s) IV Intermittent once  magnesium sulfate  IVPB 2 Gram(s) IV Intermittent once    MEDICATIONS  (PRN):  acetaminophen     Tablet .. 650 milliGRAM(s) Oral every 6 hours PRN Temp greater or equal to 38C (100.4F), Mild Pain (1 - 3)  dextrose Oral Gel 15 Gram(s) Oral once PRN Blood Glucose LESS THAN 70 milliGRAM(s)/deciliter  melatonin 3 milliGRAM(s) Oral at bedtime PRN Insomnia  ondansetron Injectable 4 milliGRAM(s) IV Push every 8 hours PRN Nausea and/or Vomiting      Allergies    No Known Allergies    Intolerances      Review of Systems:  Constitutional: No fever  Eyes: No blurry vision  Neuro: No tremors  HEENT: No pain  Cardiovascular: No chest pain, palpitations  Respiratory: No SOB, no cough  GI: No nausea, vomiting, abdominal pain  : No dysuria  Skin: no rash  Psych: no depression  Endocrine: no polyuria, polydipsia  Hem/lymph: no swelling  Osteoporosis: no fractures    ALL OTHER SYSTEMS REVIEWED AND NEGATIVE    UNABLE TO OBTAIN    PHYSICAL EXAM:  VITALS: T(C): 36.8 (06-05-23 @ 07:20)  T(F): 98.3 (06-05-23 @ 07:20), Max: 98.8 (06-04-23 @ 14:03)  HR: 106 (06-05-23 @ 07:20) (99 - 108)  BP: 154/86 (06-05-23 @ 07:20) (148/98 - 182/113)  RR:  (16 - 18)  SpO2:  (98% - 100%)  Wt(kg): --  GENERAL: NAD, well-groomed, well-developed  EYES: No proptosis, no lid lag, anicteric  HEENT:  Atraumatic, Normocephalic, moist mucous membranes  THYROID: Normal size, no palpable nodules  RESPIRATORY: Clear to auscultation bilaterally; No rales, rhonchi, wheezing  CARDIOVASCULAR: Regular rate and rhythm; No murmurs; no peripheral edema  GI: Soft, nontender, non distended, normal bowel sounds  SKIN: Dry, intact, No rashes or lesions  MUSCULOSKELETAL: Full range of motion, normal strength  NEURO: sensation intact, extraocular movements intact, no tremor  PSYCH: Alert and oriented x 3, normal affect, normal mood  CUSHING'S SIGNS: no striae      CAPILLARY BLOOD GLUCOSE      POCT Blood Glucose.: 206 mg/dL (05 Jun 2023 13:28)  POCT Blood Glucose.: 216 mg/dL (05 Jun 2023 12:21)  POCT Blood Glucose.: 225 mg/dL (05 Jun 2023 10:25)  POCT Blood Glucose.: 243 mg/dL (05 Jun 2023 08:46)  POCT Blood Glucose.: 262 mg/dL (04 Jun 2023 22:36)  POCT Blood Glucose.: 254 mg/dL (04 Jun 2023 17:47)  POCT Blood Glucose.: 286 mg/dL (04 Jun 2023 16:17)  POCT Blood Glucose.: 312 mg/dL (04 Jun 2023 13:30)                            14.2   8.21  )-----------( 315      ( 05 Jun 2023 06:15 )             40.5       06-05    128<L>  |  90<L>  |  15  ----------------------------<  245<H>  3.9   |  23  |  0.47<L>    eGFR: 122    Ca    9.5      06-05  Mg     1.50     06-05  Phos  2.8     06-05    TPro  8.0  /  Alb  4.4  /  TBili  0.6  /  DBili  x   /  AST  13  /  ALT  14  /  AlkPhos  96  06-05      Thyroid Function Tests:      A1C with Estimated Average Glucose Result: 12.8 % (06-04-23 @ 10:38)      06-05 Chol 262<H> Direct LDL -- LDL calculated 166<H> HDL 81 Trig 74    Radiology:                HPI:  Please note: History was obtained subjectively, as patient's partner works in , and records primarily at [place] (not [facility] affiliated)  43 year old female with past history of T2DM, HTN, DVT/PE s/p IVC filter, PCOS, takotsubo cardiomyopathy, currently visiting from [place], presents with 1 day of feeling generally unwell with nausea, vomiting, dizziness, epigastric discomfort, weakness and increased frequency. She denies any infective symptoms or fevers. She reports poor oral intake yesterday due to her symptoms. She vomited 10x yesterday, non bloody, non bilious vomits. This is on the background of being diagnosed recently with gastroparesis (based on the patient describes as likely results of a barium swallow) in April and is currently awaiting GI appointment. She is currently not on any regular medication for the gastroparesis. She describes the GI symptoms to occur infrequently, and she denies any significant weight loss. On admission her BG was 420, labs not consistent with DKA (pH 7.34, BHB 1, HCO3 30, AG normal. HbA1c 12.8%. Given 10 units of lantus last night and ademlog 3 units with meals. She also was hypertensive /86 and had elevated LDL and triglycerides. She is currently on losartan 50mg daily, which she reports taking most days, and is not on any statin therapy. Endocrine consulted for hyperglycemia management.     Endocrine history  Patient reports being diagnosed with T2DM in her late 20’s. She was initially commenced on metformin, but due to GI side effects was unable to tolerate even at low doses. She has sine been prescribed basal bolus, which she reports to being noncompliant with over the past 6 months due to increasing fear of giving herself injections. She decided to stop taking her basal insulin months ago, and in the past few months has completely stopped taking all insulin. She not been checking her BGs during this period. She has not seen an endocrinologist for years, but does recall that there were discussion regarding using a pump in the past, but she was not open to this at the time of discussion due to not wanting to have a device attached to her at all times. She does have microvascular complications including early onset glaucoma, potentially requiring surgery, she recalls being told she has protein in her urine, but denies neuropathy. She has no history of macrovascular complications to date. She has evidence of autonomic dysfunction – likely gastroparesis and possible orthostatic hypotension – describes can get dizzy when getting up quickly     Endocrinologist: nil  HbA1c 12.8%  Smoke: denies  ETOH: denies  Diet: oatmeal, eggs, sausages, tea +equal, salads, soups, rice, meat and vegetables   Work: teacher   Menstrual cycles: irregular, misses every few months   Contraception: barrier, previous on OCP – stopped due to history of DVT/PE  Family history: cancer, lupus in cousins       PAST MEDICAL & SURGICAL HISTORY:  Diabetes    MEDICATIONS  (STANDING):  atorvastatin 40 milliGRAM(s) Oral at bedtime  dextrose 5%. 1000 milliLiter(s) (50 mL/Hr) IV Continuous <Continuous>  dextrose 5%. 1000 milliLiter(s) (100 mL/Hr) IV Continuous <Continuous>  dextrose 50% Injectable 25 Gram(s) IV Push once  dextrose 50% Injectable 12.5 Gram(s) IV Push once  dextrose 50% Injectable 25 Gram(s) IV Push once  famotidine    Tablet 20 milliGRAM(s) Oral two times a day  glucagon  Injectable 1 milliGRAM(s) IntraMuscular once  heparin   Injectable 5000 Unit(s) SubCutaneous every 8 hours  insulin glargine Injectable (LANTUS) 14 Unit(s) SubCutaneous at bedtime  insulin lispro (ADMELOG) corrective regimen sliding scale   SubCutaneous at bedtime  insulin lispro (ADMELOG) corrective regimen sliding scale   SubCutaneous three times a day before meals  insulin lispro Injectable (ADMELOG) 5 Unit(s) SubCutaneous three times a day before meals  lactated ringers. 1000 milliLiter(s) (75 mL/Hr) IV Continuous <Continuous>  losartan 50 milliGRAM(s) Oral daily  magnesium sulfate  IVPB 2 Gram(s) IV Intermittent once  magnesium sulfate  IVPB 2 Gram(s) IV Intermittent once    MEDICATIONS  (PRN):  acetaminophen     Tablet .. 650 milliGRAM(s) Oral every 6 hours PRN Temp greater or equal to 38C (100.4F), Mild Pain (1 - 3)  dextrose Oral Gel 15 Gram(s) Oral once PRN Blood Glucose LESS THAN 70 milliGRAM(s)/deciliter  melatonin 3 milliGRAM(s) Oral at bedtime PRN Insomnia  ondansetron Injectable 4 milliGRAM(s) IV Push every 8 hours PRN Nausea and/or Vomiting      Allergies    No Known Allergies    Intolerances      Review of Systems:  Constitutional: No fever  Eyes: No blurry vision  Neuro: No tremors  HEENT: No pain  Cardiovascular: No chest pain, palpitations  Respiratory: No SOB, no cough  GI: +nausea, vomiting, abdominal pain  : No dysuria  Skin: no rash  Psych: no depression  Endocrine: no polyuria, polydipsia  Hem/lymph: no swelling  Osteoporosis: no fractures    ALL OTHER SYSTEMS REVIEWED AND NEGATIVE    PHYSICAL EXAM:  VITALS: T(C): 36.8 (06-05-23 @ 07:20)  T(F): 98.3 (06-05-23 @ 07:20), Max: 98.8 (06-04-23 @ 14:03)  HR: 106 (06-05-23 @ 07:20) (99 - 108)  BP: 154/86 (06-05-23 @ 07:20) (148/98 - 182/113)  RR:  (16 - 18)  SpO2:  (98% - 100%)    GENERAL:  patient looks unwell, lying in bed   EYES: No proptosis, no lid lag, anicteric  HEENT:  Atraumatic, Normocephalic, moist mucous membranes  RESPIRATORY: Clear to auscultation bilaterally; No rales, rhonchi, wheezing  CARDIOVASCULAR: Regular rate and rhythm; No murmurs; no peripheral edema  GI: Soft, nontender, non distended  SKIN: Dry, intact, No rashes or lesions  MUSCULOSKELETAL: Full range of motion, normal strength  NEURO: sensation intact, extraocular movements intact, no tremor  PSYCH: Alert and oriented x 3, normal affect, normal mood    CAPILLARY BLOOD GLUCOSE      POCT Blood Glucose.: 206 mg/dL (05 Jun 2023 13:28)  POCT Blood Glucose.: 216 mg/dL (05 Jun 2023 12:21)  POCT Blood Glucose.: 225 mg/dL (05 Jun 2023 10:25)  POCT Blood Glucose.: 243 mg/dL (05 Jun 2023 08:46)  POCT Blood Glucose.: 262 mg/dL (04 Jun 2023 22:36)  POCT Blood Glucose.: 254 mg/dL (04 Jun 2023 17:47)  POCT Blood Glucose.: 286 mg/dL (04 Jun 2023 16:17)  POCT Blood Glucose.: 312 mg/dL (04 Jun 2023 13:30)                            14.2   8.21  )-----------( 315      ( 05 Jun 2023 06:15 )             40.5       06-05    128<L>  |  90<L>  |  15  ----------------------------<  245<H>  3.9   |  23  |  0.47<L>    eGFR: 122    Ca    9.5      06-05  Mg     1.50     06-05  Phos  2.8     06-05    TPro  8.0  /  Alb  4.4  /  TBili  0.6  /  DBili  x   /  AST  13  /  ALT  14  /  AlkPhos  96  06-05      Thyroid Function Tests:      A1C with Estimated Average Glucose Result: 12.8 % (06-04-23 @ 10:38)      06-05 Chol 262<H> Direct LDL -- LDL calculated 166<H> HDL 81 Trig 74    Radiology:

## 2023-06-05 NOTE — PROGRESS NOTE ADULT - SUBJECTIVE AND OBJECTIVE BOX
INTERVAL HPI/OVERNIGHT EVENTS:    SUBJECTIVE: Patient seen and examined at bedside.         OBJECTIVE:    VITAL SIGNS:  ICU Vital Signs Last 24 Hrs  T(C): 37.1 (2023 22:52), Max: 37.1 (2023 14:03)  T(F): 98.8 (2023 22:52), Max: 98.8 (2023 14:03)  HR: 108 (2023 22:52) (88 - 108)  BP: 150/85 (2023 22:52) (148/98 - 182/113)  BP(mean): --  ABP: --  ABP(mean): --  RR: 16 (2023 22:52) (16 - 18)  SpO2: 98% (2023 22:52) (98% - 100%)    O2 Parameters below as of 2023 22:52  Patient On (Oxygen Delivery Method): room air            Plateau pressure:   P/F ratio:     CAPILLARY BLOOD GLUCOSE      POCT Blood Glucose.: 262 mg/dL (2023 22:36)    ECG:    PHYSICAL EXAM:    General: NAD  HEENT: clear conjunctiva  Neck: supple  Respiratory: CTA b/l  Cardiovascular: +S1/S2; RRR  Abdomen: soft, NT/ND; +BS x4  Extremities: 2+ peripheral pulses b/l; no LE edema  Skin: normal color and turgor; no rash  Neurological: AOx3    MEDICATIONS:  MEDICATIONS  (STANDING):  dextrose 5%. 1000 milliLiter(s) (100 mL/Hr) IV Continuous <Continuous>  dextrose 5%. 1000 milliLiter(s) (50 mL/Hr) IV Continuous <Continuous>  dextrose 50% Injectable 25 Gram(s) IV Push once  dextrose 50% Injectable 12.5 Gram(s) IV Push once  dextrose 50% Injectable 25 Gram(s) IV Push once  famotidine    Tablet 20 milliGRAM(s) Oral two times a day  glucagon  Injectable 1 milliGRAM(s) IntraMuscular once  heparin   Injectable 5000 Unit(s) SubCutaneous every 8 hours  insulin glargine Injectable (LANTUS) 10 Unit(s) SubCutaneous at bedtime  insulin lispro (ADMELOG) corrective regimen sliding scale   SubCutaneous three times a day before meals  insulin lispro (ADMELOG) corrective regimen sliding scale   SubCutaneous at bedtime  insulin lispro Injectable (ADMELOG) 3 Unit(s) SubCutaneous three times a day before meals  lactated ringers. 1000 milliLiter(s) (75 mL/Hr) IV Continuous <Continuous>  losartan 50 milliGRAM(s) Oral daily    MEDICATIONS  (PRN):  acetaminophen     Tablet .. 650 milliGRAM(s) Oral every 6 hours PRN Temp greater or equal to 38C (100.4F), Mild Pain (1 - 3)  dextrose Oral Gel 15 Gram(s) Oral once PRN Blood Glucose LESS THAN 70 milliGRAM(s)/deciliter  melatonin 3 milliGRAM(s) Oral at bedtime PRN Insomnia  ondansetron Injectable 4 milliGRAM(s) IV Push every 8 hours PRN Nausea and/or Vomiting      LABS:                        14.2   8.21  )-----------( 315      ( 2023 06:15 )             40.5     06-05    128<L>  |  90<L>  |  15  ----------------------------<  245<H>  3.9   |  23  |  0.47<L>    Ca    9.5      2023 06:15  Phos  2.8     06-05  Mg     1.50     06-05    TPro  8.0  /  Alb  4.4  /  TBili  0.6  /  DBili  x   /  AST  13  /  ALT  14  /  AlkPhos  96  06-05    PT/INR - ( 2023 06:15 )   PT: 13.4 sec;   INR: 1.15 ratio         PTT - ( 2023 06:15 )  PTT:24.4 sec  Urinalysis Basic - ( 2023 12:27 )    Color: Colorless / Appearance: Clear / S.017 / pH: x  Gluc: x / Ketone: Small  / Bili: Negative / Urobili: <2 mg/dL   Blood: x / Protein: 100 mg/dL / Nitrite: Negative   Leuk Esterase: Negative / RBC: 2 /HPF / WBC 2 /HPF   Sq Epi: x / Non Sq Epi: x / Bacteria: Negative        RADIOLOGY & ADDITIONAL TESTS: Reviewed.     INTERVAL HPI/OVERNIGHT EVENTS:    SUBJECTIVE: Patient seen and examined at bedside. No acute overnight events. Decreased vomiting, still nausea, generalized weakness.     OBJECTIVE:    VITAL SIGNS:  ICU Vital Signs Last 24 Hrs  T(C): 37.1 (2023 22:52), Max: 37.1 (2023 14:03)  T(F): 98.8 (2023 22:52), Max: 98.8 (2023 14:03)  HR: 108 (2023 22:52) (88 - 108)  BP: 150/85 (2023 22:52) (148/98 - 182/113)  BP(mean): --  ABP: --  ABP(mean): --  RR: 16 (2023 22:52) (16 - 18)  SpO2: 98% (2023 22:52) (98% - 100%)    O2 Parameters below as of 2023 22:52  Patient On (Oxygen Delivery Method): room air            Plateau pressure:   P/F ratio:     CAPILLARY BLOOD GLUCOSE      POCT Blood Glucose.: 262 mg/dL (2023 22:36)    PHYSICAL EXAM:    General: NAD  HEENT: clear conjunctiva  Neck: supple  Respiratory: CTA b/l  Cardiovascular: +S1/S2; RRR  Abdomen: soft, NT/ND; +BS x4  Extremities: 2+ peripheral pulses b/l; no LE edema  Skin: normal color and turgor; no rash  Neurological: AOx3    MEDICATIONS:  MEDICATIONS  (STANDING):  dextrose 5%. 1000 milliLiter(s) (100 mL/Hr) IV Continuous <Continuous>  dextrose 5%. 1000 milliLiter(s) (50 mL/Hr) IV Continuous <Continuous>  dextrose 50% Injectable 25 Gram(s) IV Push once  dextrose 50% Injectable 12.5 Gram(s) IV Push once  dextrose 50% Injectable 25 Gram(s) IV Push once  famotidine    Tablet 20 milliGRAM(s) Oral two times a day  glucagon  Injectable 1 milliGRAM(s) IntraMuscular once  heparin   Injectable 5000 Unit(s) SubCutaneous every 8 hours  insulin glargine Injectable (LANTUS) 10 Unit(s) SubCutaneous at bedtime  insulin lispro (ADMELOG) corrective regimen sliding scale   SubCutaneous three times a day before meals  insulin lispro (ADMELOG) corrective regimen sliding scale   SubCutaneous at bedtime  insulin lispro Injectable (ADMELOG) 3 Unit(s) SubCutaneous three times a day before meals  lactated ringers. 1000 milliLiter(s) (75 mL/Hr) IV Continuous <Continuous>  losartan 50 milliGRAM(s) Oral daily    MEDICATIONS  (PRN):  acetaminophen     Tablet .. 650 milliGRAM(s) Oral every 6 hours PRN Temp greater or equal to 38C (100.4F), Mild Pain (1 - 3)  dextrose Oral Gel 15 Gram(s) Oral once PRN Blood Glucose LESS THAN 70 milliGRAM(s)/deciliter  melatonin 3 milliGRAM(s) Oral at bedtime PRN Insomnia  ondansetron Injectable 4 milliGRAM(s) IV Push every 8 hours PRN Nausea and/or Vomiting      LABS:                        14.2   8.21  )-----------( 315      ( 2023 06:15 )             40.5     06-05    128<L>  |  90<L>  |  15  ----------------------------<  245<H>  3.9   |  23  |  0.47<L>    Ca    9.5      2023 06:15  Phos  2.8     06-05  Mg     1.50     06-05    TPro  8.0  /  Alb  4.4  /  TBili  0.6  /  DBili  x   /  AST  13  /  ALT  14  /  AlkPhos  96  06-05    PT/INR - ( 2023 06:15 )   PT: 13.4 sec;   INR: 1.15 ratio         PTT - ( 2023 06:15 )  PTT:24.4 sec  Urinalysis Basic - ( 2023 12:27 )    Color: Colorless / Appearance: Clear / S.017 / pH: x  Gluc: x / Ketone: Small  / Bili: Negative / Urobili: <2 mg/dL   Blood: x / Protein: 100 mg/dL / Nitrite: Negative   Leuk Esterase: Negative / RBC: 2 /HPF / WBC 2 /HPF   Sq Epi: x / Non Sq Epi: x / Bacteria: Negative        RADIOLOGY & ADDITIONAL TESTS: Reviewed.

## 2023-06-05 NOTE — PROGRESS NOTE ADULT - PROBLEM SELECTOR PLAN 1
Hx of T2DM, uncontrolled with hyperglycemia (states usually reads in the 300s)  Was offered an insulin pump in the past but declined  - unclear why she wasn't offered oral regimen in the past- she was just offered metformin but got upset stomach and never offered anything else   - POCG 300s on admission, no AG, BHB 0.5, small urinary ketones, normal pH- not in DKA  - HCG negative  - on Novolog 3U TID at home, not on long acting, not on oral, reports insurance hurdles as well  - 2/p 2L LR bolus    Plan:   Insulin Basal/Bolus regimen at .3 of body weight  Zofran/pepcid  LR maintenance fluids  Endo consult for further management  BMP, VBG Q12H for now Hx of T2DM, uncontrolled with hyperglycemia (states usually reads in the 300s)  Was offered an insulin pump in the past but declined  - unclear why she wasn't offered oral regimen in the past- she was just offered metformin but got upset stomach and never offered anything else   - POCG 300s on admission, no AG, BHB 0.5, small urinary ketones, normal pH- not in DKA  - HCG negative  - on Novolog 3U TID at home, not on long acting, not on oral, reports insurance hurdles as well  - 2/p 2L LR bolus    Plan:   Insulin Basal/Bolus regimen at .3-->.4 of body weight  Zofran/pepcid  LR maintenance fluids  Endo consult for further management  BMP, VBG Q12H for now

## 2023-06-05 NOTE — CONSULT NOTE ADULT - ASSESSMENT
43 year old female with poorly controlled T2DM, HTN and hyperlipidemia, presents with nausea, vomiting and severe hyperglycemia in setting of likely diagnosis of gastroparesis.     Reccs as inpatient   1. T2DM with hyperglycemia  Off insulin prior to admission  -Aim -180mg/dL as inpatient   -Continue basal bolus (patient open to giving herself injection on discharge +/- assistance from )  -Increase dose of lantus to 14 units at bedtime  -Increase admelog to 5 units pre meals TID (HOLD if NPO/or eats <25% of meal)  -Continue moderate dose ademlog correction scale pre meals and low dose correction scale pre bed  -Follow up BRITTNI and Zinc8 antibodies (ordered in ED) although more likely DM2 diagnosis given lack of DKA while off insulin.  -BGs pre meals and prebed   -Carb consistent diet   -RD consult  -Will need to discuss contraception options as patient currently sexually active with significantly elevated HbA1c     DC planning  -Given degree of hyperglycemia, HbA1c 12.8% and evidence of diabetic complications, would benefit from basal and bolus insulin pens. Would check coverage to see which brand of basal and bolus pens are covered. Give GI symptoms, would not be a good candidate for GLP-1 agonist or metformin currently.   -Consider adding CGM such as Joselyn 3 at discharge.  -Re-educate/teach insulin (best if  is present)    -Please ensure patient is provided with pens, lancets, needles and test strips prior to discharge   -PCP to refer patient to endocrinologist in [place]     2. HTN  -Aim BP <130/80  -Currently on losartan 50mg daily   -If persistently elevated consider adding a second agent (ie dihydropyridine CCB)  -Urinary ACR     3. Hyperlipidemia   -Aim LDL <70  -10 year ASCVD risk 5.3%  -Consider mod dose statin      4. Gastroparesis  -Continue antiemetics  -Target better glycemic control   -Small meals – may benefit from dietician review   -Has outpatient GI appointment      Endocrine team consulted for uncontrolled diabetes. Patient is high risk with high level decision making due to uncontrolled diabetes which places patient at high risk for cardiovascular and cerebrovascular events. Patient with lability of glucose requiring close monitoring and insulin adjustments.     Oleg Avalos MD  Division of Endocrinology  Pager: [phone]    If after 6PM or before 9AM, or on weekends/holidays, please call endocrine answering service for assistance ([phone]).  For nonurgent matters email [email or web address] for assistance.

## 2023-06-06 ENCOUNTER — TRANSCRIPTION ENCOUNTER (OUTPATIENT)
Age: 43
End: 2023-06-06

## 2023-06-06 LAB
ANION GAP SERPL CALC-SCNC: 15 MMOL/L — HIGH (ref 7–14)
BUN SERPL-MCNC: 19 MG/DL — SIGNIFICANT CHANGE UP (ref 7–23)
CALCIUM SERPL-MCNC: 9.3 MG/DL — SIGNIFICANT CHANGE UP (ref 8.4–10.5)
CHLORIDE SERPL-SCNC: 93 MMOL/L — LOW (ref 98–107)
CO2 SERPL-SCNC: 23 MMOL/L — SIGNIFICANT CHANGE UP (ref 22–31)
CREAT SERPL-MCNC: 0.49 MG/DL — LOW (ref 0.5–1.3)
EGFR: 121 ML/MIN/1.73M2 — SIGNIFICANT CHANGE UP
GLUCOSE BLDC GLUCOMTR-MCNC: 107 MG/DL — HIGH (ref 70–99)
GLUCOSE BLDC GLUCOMTR-MCNC: 111 MG/DL — HIGH (ref 70–99)
GLUCOSE BLDC GLUCOMTR-MCNC: 119 MG/DL — HIGH (ref 70–99)
GLUCOSE BLDC GLUCOMTR-MCNC: 73 MG/DL — SIGNIFICANT CHANGE UP (ref 70–99)
GLUCOSE BLDC GLUCOMTR-MCNC: 77 MG/DL — SIGNIFICANT CHANGE UP (ref 70–99)
GLUCOSE BLDC GLUCOMTR-MCNC: 85 MG/DL — SIGNIFICANT CHANGE UP (ref 70–99)
GLUCOSE SERPL-MCNC: 110 MG/DL — HIGH (ref 70–99)
HCT VFR BLD CALC: 42 % — SIGNIFICANT CHANGE UP (ref 34.5–45)
HGB BLD-MCNC: 14.7 G/DL — SIGNIFICANT CHANGE UP (ref 11.5–15.5)
LUPUS ANTICOAGULANT PROFILE RESULT: SIGNIFICANT CHANGE UP
MAGNESIUM SERPL-MCNC: 2 MG/DL — SIGNIFICANT CHANGE UP (ref 1.6–2.6)
MCHC RBC-ENTMCNC: 29.2 PG — SIGNIFICANT CHANGE UP (ref 27–34)
MCHC RBC-ENTMCNC: 35 GM/DL — SIGNIFICANT CHANGE UP (ref 32–36)
MCV RBC AUTO: 83.5 FL — SIGNIFICANT CHANGE UP (ref 80–100)
NRBC # BLD: 0 /100 WBCS — SIGNIFICANT CHANGE UP (ref 0–0)
NRBC # FLD: 0 K/UL — SIGNIFICANT CHANGE UP (ref 0–0)
PHOSPHATE SERPL-MCNC: 2.8 MG/DL — SIGNIFICANT CHANGE UP (ref 2.5–4.5)
PLATELET # BLD AUTO: 323 K/UL — SIGNIFICANT CHANGE UP (ref 150–400)
POTASSIUM SERPL-MCNC: 3.2 MMOL/L — LOW (ref 3.5–5.3)
POTASSIUM SERPL-SCNC: 3.2 MMOL/L — LOW (ref 3.5–5.3)
RBC # BLD: 5.03 M/UL — SIGNIFICANT CHANGE UP (ref 3.8–5.2)
RBC # FLD: 12.4 % — SIGNIFICANT CHANGE UP (ref 10.3–14.5)
SODIUM SERPL-SCNC: 131 MMOL/L — LOW (ref 135–145)
WBC # BLD: 6.94 K/UL — SIGNIFICANT CHANGE UP (ref 3.8–10.5)
WBC # FLD AUTO: 6.94 K/UL — SIGNIFICANT CHANGE UP (ref 3.8–10.5)

## 2023-06-06 PROCEDURE — 99233 SBSQ HOSP IP/OBS HIGH 50: CPT | Mod: GC

## 2023-06-06 PROCEDURE — 99232 SBSQ HOSP IP/OBS MODERATE 35: CPT

## 2023-06-06 RX ORDER — ISOPROPYL ALCOHOL, BENZOCAINE .7; .06 ML/ML; ML/ML
0 SWAB TOPICAL
Qty: 100 | Refills: 1
Start: 2023-06-06

## 2023-06-06 RX ORDER — LOSARTAN POTASSIUM 100 MG/1
1 TABLET, FILM COATED ORAL
Qty: 30 | Refills: 0
Start: 2023-06-06 | End: 2023-07-05

## 2023-06-06 RX ORDER — ATORVASTATIN CALCIUM 80 MG/1
1 TABLET, FILM COATED ORAL
Qty: 30 | Refills: 0
Start: 2023-06-06 | End: 2023-07-05

## 2023-06-06 RX ORDER — INSULIN LISPRO 100/ML
5 VIAL (ML) SUBCUTANEOUS
Qty: 1 | Refills: 0
Start: 2023-06-06 | End: 2023-07-05

## 2023-06-06 RX ORDER — FAMOTIDINE 10 MG/ML
1 INJECTION INTRAVENOUS
Qty: 30 | Refills: 0
Start: 2023-06-06 | End: 2023-06-20

## 2023-06-06 RX ORDER — AMLODIPINE BESYLATE 2.5 MG/1
5 TABLET ORAL DAILY
Refills: 0 | Status: DISCONTINUED | OUTPATIENT
Start: 2023-06-06 | End: 2023-06-07

## 2023-06-06 RX ORDER — INSULIN GLARGINE 100 [IU]/ML
14 INJECTION, SOLUTION SUBCUTANEOUS
Qty: 1 | Refills: 0
Start: 2023-06-06 | End: 2023-07-05

## 2023-06-06 RX ORDER — AMLODIPINE BESYLATE 2.5 MG/1
1 TABLET ORAL
Qty: 30 | Refills: 0
Start: 2023-06-06 | End: 2023-07-05

## 2023-06-06 RX ORDER — INSULIN GLARGINE 100 [IU]/ML
14 INJECTION, SOLUTION SUBCUTANEOUS
Qty: 1 | Refills: 3
Start: 2023-06-06 | End: 2023-10-03

## 2023-06-06 RX ORDER — INSULIN GLARGINE 100 [IU]/ML
14 INJECTION, SOLUTION SUBCUTANEOUS
Qty: 5 | Refills: 0
Start: 2023-06-06

## 2023-06-06 RX ORDER — POTASSIUM CHLORIDE 20 MEQ
40 PACKET (EA) ORAL EVERY 4 HOURS
Refills: 0 | Status: COMPLETED | OUTPATIENT
Start: 2023-06-06 | End: 2023-06-06

## 2023-06-06 RX ORDER — INSULIN LISPRO 100/ML
5 VIAL (ML) SUBCUTANEOUS
Qty: 3 | Refills: 0
Start: 2023-06-06

## 2023-06-06 RX ORDER — INSULIN LISPRO 100/ML
3 VIAL (ML) SUBCUTANEOUS
Refills: 0 | Status: DISCONTINUED | OUTPATIENT
Start: 2023-06-06 | End: 2023-06-07

## 2023-06-06 RX ORDER — METOCLOPRAMIDE HCL 10 MG
10 TABLET ORAL ONCE
Refills: 0 | Status: COMPLETED | OUTPATIENT
Start: 2023-06-06 | End: 2023-06-06

## 2023-06-06 RX ORDER — SODIUM CHLORIDE 9 MG/ML
500 INJECTION, SOLUTION INTRAVENOUS ONCE
Refills: 0 | Status: COMPLETED | OUTPATIENT
Start: 2023-06-06 | End: 2023-06-06

## 2023-06-06 RX ADMIN — INSULIN GLARGINE 14 UNIT(S): 100 INJECTION, SOLUTION SUBCUTANEOUS at 21:16

## 2023-06-06 RX ADMIN — FAMOTIDINE 20 MILLIGRAM(S): 10 INJECTION INTRAVENOUS at 17:57

## 2023-06-06 RX ADMIN — Medication 40 MILLIEQUIVALENT(S): at 14:39

## 2023-06-06 RX ADMIN — HEPARIN SODIUM 5000 UNIT(S): 5000 INJECTION INTRAVENOUS; SUBCUTANEOUS at 06:10

## 2023-06-06 RX ADMIN — ATORVASTATIN CALCIUM 40 MILLIGRAM(S): 80 TABLET, FILM COATED ORAL at 21:16

## 2023-06-06 RX ADMIN — Medication 5 UNIT(S): at 08:22

## 2023-06-06 RX ADMIN — LOSARTAN POTASSIUM 100 MILLIGRAM(S): 100 TABLET, FILM COATED ORAL at 06:09

## 2023-06-06 RX ADMIN — Medication 40 MILLIEQUIVALENT(S): at 10:59

## 2023-06-06 RX ADMIN — HEPARIN SODIUM 5000 UNIT(S): 5000 INJECTION INTRAVENOUS; SUBCUTANEOUS at 14:39

## 2023-06-06 RX ADMIN — HEPARIN SODIUM 5000 UNIT(S): 5000 INJECTION INTRAVENOUS; SUBCUTANEOUS at 21:16

## 2023-06-06 RX ADMIN — AMLODIPINE BESYLATE 5 MILLIGRAM(S): 2.5 TABLET ORAL at 14:39

## 2023-06-06 RX ADMIN — Medication 30 MILLILITER(S): at 06:10

## 2023-06-06 RX ADMIN — SODIUM CHLORIDE 1000 MILLILITER(S): 9 INJECTION, SOLUTION INTRAVENOUS at 21:21

## 2023-06-06 RX ADMIN — FAMOTIDINE 20 MILLIGRAM(S): 10 INJECTION INTRAVENOUS at 06:09

## 2023-06-06 RX ADMIN — Medication 10 MILLIGRAM(S): at 10:59

## 2023-06-06 RX ADMIN — Medication 5 UNIT(S): at 12:24

## 2023-06-06 NOTE — PROGRESS NOTE ADULT - SUBJECTIVE AND OBJECTIVE BOX
Chief Complaint: DM 2    History: Patient seen at bedside. Reports she is not eating complete meals, feels like having soup or broth instead of solid food. Denies n/v, denies s/s of hypoglycemia  Patient reports she previously was using insulin PENS - knows how to inject insulin. Had self-discontinued all insulin and FS about 6 months ago  Understands need to resume basal/bolus insulin on discharge with A1c 12.8%  Reports she will followup with PCP in [place] and will find endocrinology provider there    MEDICATIONS  (STANDING):  amLODIPine   Tablet 5 milliGRAM(s) Oral daily  atorvastatin 40 milliGRAM(s) Oral at bedtime  dextrose 5%. 1000 milliLiter(s) (50 mL/Hr) IV Continuous <Continuous>  dextrose 5%. 1000 milliLiter(s) (100 mL/Hr) IV Continuous <Continuous>  dextrose 50% Injectable 25 Gram(s) IV Push once  dextrose 50% Injectable 12.5 Gram(s) IV Push once  dextrose 50% Injectable 25 Gram(s) IV Push once  famotidine    Tablet 20 milliGRAM(s) Oral two times a day  glucagon  Injectable 1 milliGRAM(s) IntraMuscular once  heparin   Injectable 5000 Unit(s) SubCutaneous every 8 hours  insulin glargine Injectable (LANTUS) 14 Unit(s) SubCutaneous at bedtime  insulin lispro (ADMELOG) corrective regimen sliding scale   SubCutaneous three times a day before meals  insulin lispro (ADMELOG) corrective regimen sliding scale   SubCutaneous at bedtime  insulin lispro Injectable (ADMELOG) 5 Unit(s) SubCutaneous three times a day before meals  lactated ringers. 1000 milliLiter(s) (75 mL/Hr) IV Continuous <Continuous>  losartan 100 milliGRAM(s) Oral daily    MEDICATIONS  (PRN):  acetaminophen     Tablet .. 650 milliGRAM(s) Oral every 6 hours PRN Temp greater or equal to 38C (100.4F), Mild Pain (1 - 3)  aluminum hydroxide/magnesium hydroxide/simethicone Suspension 30 milliLiter(s) Oral every 6 hours PRN Dyspepsia  dextrose Oral Gel 15 Gram(s) Oral once PRN Blood Glucose LESS THAN 70 milliGRAM(s)/deciliter  melatonin 3 milliGRAM(s) Oral at bedtime PRN Insomnia  ondansetron Injectable 4 milliGRAM(s) IV Push every 8 hours PRN Nausea and/or Vomiting    No Known Allergies    Review of Systems:  Cardiovascular: No chest pain  Respiratory: No SOB  GI: No nausea, vomiting  Endocrine: no hypoglycemia     PHYSICAL EXAM:  VITALS: T(C): 36.9 (06-06-23 @ 13:09)  T(F): 98.4 (06-06-23 @ 13:09), Max: 99.3 (06-05-23 @ 18:58)  HR: 100 (06-06-23 @ 14:38) (94 - 100)  BP: 167/104 (06-06-23 @ 14:38) (138/88 - 169/103)  RR:  (16 - 17)  SpO2:  (98% - 100%)  Wt(kg): --  GENERAL: NAD  EYES: No proptosis, anicteric  HEENT:  Atraumatic, Normocephalic  RESPIRATORY: unlabored respirations     CAPILLARY BLOOD GLUCOSE    POCT Blood Glucose.: 77 mg/dL (06 Jun 2023 14:34)  POCT Blood Glucose.: 111 mg/dL (06 Jun 2023 12:21)  POCT Blood Glucose.: 107 mg/dL (06 Jun 2023 08:19)  POCT Blood Glucose.: 109 mg/dL (05 Jun 2023 22:58)  POCT Blood Glucose.: 137 mg/dL (05 Jun 2023 21:44)  POCT Blood Glucose.: 187 mg/dL (05 Jun 2023 17:44)      06-06    131<L>  |  93<L>  |  19  ----------------------------<  110<H>  3.2<L>   |  23  |  0.49<L>    eGFR: 121    Ca    9.3      06-06  Mg     2.00     06-06  Phos  2.8     06-06    TPro  8.0  /  Alb  4.4  /  TBili  0.6  /  DBili  x   /  AST  13  /  ALT  14  /  AlkPhos  96  06-05      C-Peptide, Serum: 1.3 ng/mL (06-05-23 @ 06:15)      Anion Gap, Serum: 15 mmol/L (06-06-23 @ 06:10)  Anion Gap, Serum: 15 mmol/L (06-05-23 @ 06:15)  Anion Gap, Serum: 14 mmol/L (06-04-23 @ 10:38)          A1C with Estimated Average Glucose Result: 12.8 % (06-04-23 @ 10:38)      Diet, Regular:   Consistent Carbohydrate No Snacks (CSTCHO) (06-05-23 @ 08:57)

## 2023-06-06 NOTE — PROGRESS NOTE ADULT - PROBLEM SELECTOR PLAN 6
Diet: CL diet, advance as tolerated  DVT: SQH  Dispo: pending clinical improvement Diet: CL diet, advance as tolerated  DVT: SQH  Dispo: pending clinical improvement, diabetes supplies to [facility]

## 2023-06-06 NOTE — DISCHARGE NOTE PROVIDER - CARE PROVIDER_API CALL
Oleg Avalos  Endocrinology/Metab/Diabetes  [address] [place] [address]  Phone: [phone]  Fax: [phone]  Follow Up Time: 1 week

## 2023-06-06 NOTE — DISCHARGE NOTE PROVIDER - NSDCMRMEDTOKEN_GEN_ALL_CORE_FT
alcohol swabs: Apply topically to affected area 4 times a day  amLODIPine 5 mg oral tablet: 1 tab(s) orally once a day Hold if feeling dizzy or have low blood pressure  atorvastatin 40 mg oral tablet: 1 tab(s) orally once a day (at bedtime)  famotidine 20 mg oral tablet: 1 tab(s) orally 2 times a day  glucometer (per patient&#x27;s insurance): Test blood sugars four times a day. Dispense #1 glucometer.  glucose tablets: Follow instructions on bottle when sugar is low.  insulin glargine 100 units/mL subcutaneous solution: 14 unit(s) subcutaneous once a day (at bedtime)  insulin lispro 100 units/mL injectable solution: 5 unit(s) injectable 3 times a day (with meals)  Insulin Pen Needles, 4mm: 1 application subcutaneously 4 times a day. ** Use with insulin pen **  lancets: 1 application subcutaneously 4 times a day  losartan 100 mg oral tablet: 1 tab(s) orally once a day Hold medication if feeling dizzy or lightheaded  test strips (per patient&#x27;s insurance): 1 application subcutaneously 4 times a day. ** Compatible with patient&#x27;s glucometer **   alcohol swabs: Apply topically to affected area 4 times a day  atorvastatin 40 mg oral tablet: 1 tab(s) orally once a day (at bedtime)  famotidine 20 mg oral tablet: 1 tab(s) orally 2 times a day  glucometer (per patient&#x27;s insurance): Test blood sugars four times a day. Dispense #1 glucometer.  glucose tablets: Follow instructions on bottle when sugar is low.  insulin glargine 100 units/mL subcutaneous solution: 12 unit(s) subcutaneous once a day (at bedtime)  insulin lispro 100 units/mL injectable solution: 2 unit(s) injectable 3 times a day (before meals) Hold if not eating  Insulin Pen Needles, 4mm: 1 application subcutaneously 4 times a day. ** Use with insulin pen **  lancets: 1 application subcutaneously 4 times a day  losartan 100 mg oral tablet: 1 tab(s) orally once a day Hold medication if feeling dizzy or lightheaded  test strips (per patient&#x27;s insurance): 1 application subcutaneously 4 times a day. ** Compatible with patient&#x27;s glucometer **

## 2023-06-06 NOTE — DIETITIAN INITIAL EVALUATION ADULT - ORAL INTAKE PTA/DIET HISTORY
Patient seen for assessment. Patient not too receptive to interview, limited information obtained. Stated fair appetite PTA. Wasn't on any DM meds, per endocrinology note (6/5) patient was non-compliant due to fear of injections. A1c is 12.8%.

## 2023-06-06 NOTE — CHART NOTE - NSCHARTNOTEFT_GEN_A_CORE
Called by RN to bedside due to concern for altered mental status. Per RN, patient's baseline is A&Ox4 and patient is now A&Ox3 and lethargic. Vitals notable for sBP to 85 and fingerstick to 79. RN gave apple juice and fingerstick improved to 119 and patient improved in mental status. Per RN, patient had 4 episodes of diarrhea during the day, now no signs of diarrhea. Physical exam shows unremarkable heart and lung sounds, abdomen soft and tender only to deep palpation. Patient is A&Ox4 and awake. Ordered 500 cc LR for hypotension.     Vinh Salgado MD PGY-1

## 2023-06-06 NOTE — DISCHARGE NOTE PROVIDER - HOSPITAL COURSE
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 A1c noted to be 12.8 for which endocrine was consulted. Patient was started on basal/bolus regimen with improvement in Blood sugars. Of note, patient was also hypertensive on admission to 180s so was uptitrated on Losartan 50--->100 and Amlodipine. TTE Echo showing grossly normal LVF, but concentric LVH likely from uncontrolled HTN. Va Duplex negative for DVT. Ucx >100,000 Enterococcus but UA was negative and patient asymptomatic.  Patient is hemodynamically stable for discharge with endocrine and primary care follow up outpatient. 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 A1c noted to be 12.8 for which endocrine was consulted. Patient was started on basal/bolus regimen with improvement in Blood sugars. Of note, patient was also hypertensive on admission to 180s so was uptitrated on Losartan 50--->100. Amlodipine was held due to episodes of hypotension. TTE Echo showing grossly normal LVF, but concentric LVH likely from uncontrolled HTN. ECG showed poor R-wave progression with possible prior cardiac injury. Va Duplex negative for DVT. Ucx >100,000 Enterococcus and UA was positive but patient asymptomatic so antibiotics were held. Sodium was low with urine studies showing high ADH componenent but patient desired to leave AMA before further workup could be done. Patient desired to leave on 6/7 against medical advice.

## 2023-06-06 NOTE — PROGRESS NOTE ADULT - SUBJECTIVE AND OBJECTIVE BOX
INTERVAL HPI/OVERNIGHT EVENTS:    SUBJECTIVE: Patient seen and examined at bedside. No acute overnight events. Decreased vomiting, still nausea, generalized weakness.     OBJECTIVE:    VITAL SIGNS:  ICU Vital Signs Last 24 Hrs  T(C): 37.1 (2023 22:52), Max: 37.1 (2023 14:03)  T(F): 98.8 (2023 22:52), Max: 98.8 (2023 14:03)  HR: 108 (2023 22:52) (88 - 108)  BP: 150/85 (2023 22:52) (148/98 - 182/113)  BP(mean): --  ABP: --  ABP(mean): --  RR: 16 (2023 22:52) (16 - 18)  SpO2: 98% (2023 22:52) (98% - 100%)    O2 Parameters below as of 2023 22:52  Patient On (Oxygen Delivery Method): room air            Plateau pressure:   P/F ratio:     CAPILLARY BLOOD GLUCOSE      POCT Blood Glucose.: 262 mg/dL (2023 22:36)    PHYSICAL EXAM:    General: NAD  HEENT: clear conjunctiva  Neck: supple  Respiratory: CTA b/l  Cardiovascular: +S1/S2; RRR  Abdomen: soft, NT/ND; +BS x4  Extremities: 2+ peripheral pulses b/l; no LE edema  Skin: normal color and turgor; no rash  Neurological: AOx3    MEDICATIONS:  MEDICATIONS  (STANDING):  dextrose 5%. 1000 milliLiter(s) (100 mL/Hr) IV Continuous <Continuous>  dextrose 5%. 1000 milliLiter(s) (50 mL/Hr) IV Continuous <Continuous>  dextrose 50% Injectable 25 Gram(s) IV Push once  dextrose 50% Injectable 12.5 Gram(s) IV Push once  dextrose 50% Injectable 25 Gram(s) IV Push once  famotidine    Tablet 20 milliGRAM(s) Oral two times a day  glucagon  Injectable 1 milliGRAM(s) IntraMuscular once  heparin   Injectable 5000 Unit(s) SubCutaneous every 8 hours  insulin glargine Injectable (LANTUS) 10 Unit(s) SubCutaneous at bedtime  insulin lispro (ADMELOG) corrective regimen sliding scale   SubCutaneous three times a day before meals  insulin lispro (ADMELOG) corrective regimen sliding scale   SubCutaneous at bedtime  insulin lispro Injectable (ADMELOG) 3 Unit(s) SubCutaneous three times a day before meals  lactated ringers. 1000 milliLiter(s) (75 mL/Hr) IV Continuous <Continuous>  losartan 50 milliGRAM(s) Oral daily    MEDICATIONS  (PRN):  acetaminophen     Tablet .. 650 milliGRAM(s) Oral every 6 hours PRN Temp greater or equal to 38C (100.4F), Mild Pain (1 - 3)  dextrose Oral Gel 15 Gram(s) Oral once PRN Blood Glucose LESS THAN 70 milliGRAM(s)/deciliter  melatonin 3 milliGRAM(s) Oral at bedtime PRN Insomnia  ondansetron Injectable 4 milliGRAM(s) IV Push every 8 hours PRN Nausea and/or Vomiting      LABS:                        14.2   8.21  )-----------( 315      ( 2023 06:15 )             40.5     06-05    128<L>  |  90<L>  |  15  ----------------------------<  245<H>  3.9   |  23  |  0.47<L>    Ca    9.5      2023 06:15  Phos  2.8     06-05  Mg     1.50     06-05    TPro  8.0  /  Alb  4.4  /  TBili  0.6  /  DBili  x   /  AST  13  /  ALT  14  /  AlkPhos  96  06-05    PT/INR - ( 2023 06:15 )   PT: 13.4 sec;   INR: 1.15 ratio         PTT - ( 2023 06:15 )  PTT:24.4 sec  Urinalysis Basic - ( 2023 12:27 )    Color: Colorless / Appearance: Clear / S.017 / pH: x  Gluc: x / Ketone: Small  / Bili: Negative / Urobili: <2 mg/dL   Blood: x / Protein: 100 mg/dL / Nitrite: Negative   Leuk Esterase: Negative / RBC: 2 /HPF / WBC 2 /HPF   Sq Epi: x / Non Sq Epi: x / Bacteria: Negative        RADIOLOGY & ADDITIONAL TESTS: Reviewed.     INTERVAL HPI/OVERNIGHT EVENTS:    SUBJECTIVE: Patient seen and examined at bedside. No acute overnight events. Decreased vomiting, still nausea, generalized weakness. Denied abdominal pain in AM.     OBJECTIVE:    VITAL SIGNS:  ICU Vital Signs Last 24 Hrs  T(C): 37.1 (2023 22:52), Max: 37.1 (2023 14:03)  T(F): 98.8 (2023 22:52), Max: 98.8 (2023 14:03)  HR: 108 (2023 22:52) (88 - 108)  BP: 150/85 (2023 22:52) (148/98 - 182/113)  BP(mean): --  ABP: --  ABP(mean): --  RR: 16 (2023 22:52) (16 - 18)  SpO2: 98% (2023 22:52) (98% - 100%)    O2 Parameters below as of 2023 22:52  Patient On (Oxygen Delivery Method): room air            Plateau pressure:   P/F ratio:     CAPILLARY BLOOD GLUCOSE      POCT Blood Glucose.: 262 mg/dL (2023 22:36)    PHYSICAL EXAM:    General: NAD  HEENT: clear conjunctiva  Neck: supple  Respiratory: CTA b/l  Cardiovascular: +S1/S2; RRR  Abdomen: soft, NT/ND; +BS x4  Extremities: 2+ peripheral pulses b/l; no LE edema  Skin: normal color and turgor; no rash  Neurological: AOx3    MEDICATIONS:  MEDICATIONS  (STANDING):  dextrose 5%. 1000 milliLiter(s) (100 mL/Hr) IV Continuous <Continuous>  dextrose 5%. 1000 milliLiter(s) (50 mL/Hr) IV Continuous <Continuous>  dextrose 50% Injectable 25 Gram(s) IV Push once  dextrose 50% Injectable 12.5 Gram(s) IV Push once  dextrose 50% Injectable 25 Gram(s) IV Push once  famotidine    Tablet 20 milliGRAM(s) Oral two times a day  glucagon  Injectable 1 milliGRAM(s) IntraMuscular once  heparin   Injectable 5000 Unit(s) SubCutaneous every 8 hours  insulin glargine Injectable (LANTUS) 10 Unit(s) SubCutaneous at bedtime  insulin lispro (ADMELOG) corrective regimen sliding scale   SubCutaneous three times a day before meals  insulin lispro (ADMELOG) corrective regimen sliding scale   SubCutaneous at bedtime  insulin lispro Injectable (ADMELOG) 3 Unit(s) SubCutaneous three times a day before meals  lactated ringers. 1000 milliLiter(s) (75 mL/Hr) IV Continuous <Continuous>  losartan 50 milliGRAM(s) Oral daily    MEDICATIONS  (PRN):  acetaminophen     Tablet .. 650 milliGRAM(s) Oral every 6 hours PRN Temp greater or equal to 38C (100.4F), Mild Pain (1 - 3)  dextrose Oral Gel 15 Gram(s) Oral once PRN Blood Glucose LESS THAN 70 milliGRAM(s)/deciliter  melatonin 3 milliGRAM(s) Oral at bedtime PRN Insomnia  ondansetron Injectable 4 milliGRAM(s) IV Push every 8 hours PRN Nausea and/or Vomiting      LABS:                        14.2   8.21  )-----------( 315      ( 2023 06:15 )             40.5     06-05    128<L>  |  90<L>  |  15  ----------------------------<  245<H>  3.9   |  23  |  0.47<L>    Ca    9.5      2023 06:15  Phos  2.8     06-05  Mg     1.50     06-05    TPro  8.0  /  Alb  4.4  /  TBili  0.6  /  DBili  x   /  AST  13  /  ALT  14  /  AlkPhos  96  06-05    PT/INR - ( 2023 06:15 )   PT: 13.4 sec;   INR: 1.15 ratio         PTT - ( 2023 06:15 )  PTT:24.4 sec  Urinalysis Basic - ( 2023 12:27 )    Color: Colorless / Appearance: Clear / S.017 / pH: x  Gluc: x / Ketone: Small  / Bili: Negative / Urobili: <2 mg/dL   Blood: x / Protein: 100 mg/dL / Nitrite: Negative   Leuk Esterase: Negative / RBC: 2 /HPF / WBC 2 /HPF   Sq Epi: x / Non Sq Epi: x / Bacteria: Negative        RADIOLOGY & ADDITIONAL TESTS: Reviewed.

## 2023-06-06 NOTE — PROGRESS NOTE ADULT - PROBLEM SELECTOR PLAN 5
HTN to 180s on admission   Losartan, home med  further BP control as needed--> prefer to increase Losartan dose as needed HTN to 180s on admission   Losartan, home med---> increased to 100mg  further BP control as needed--> if no response to increase in Losartan, can consider Amlodipine

## 2023-06-06 NOTE — DISCHARGE NOTE PROVIDER - NSFOLLOWUPCLINICS_GEN_ALL_ED_FT
[facility]  Internal Medicine  [address] [place] [address]  Phone: [phone]  Fax: [phone]  Follow Up Time: 1 week    [facility]  Podiatry  [address] [place] [address] [address] [place] [address]  Phone: [phone]  Fax:   Follow Up Time: 1 week    An Ophthalmologist  Ophthalmology  .  [place]   Phone:   Fax:   Follow Up Time: 1 week

## 2023-06-06 NOTE — DISCHARGE NOTE PROVIDER - REASON FOR ADMISSION
If you are a smoker, it is important for your health to stop smoking. Please be aware that second hand smoke is also harmful.
Hyperglycemia

## 2023-06-06 NOTE — PROGRESS NOTE ADULT - PROBLEM SELECTOR PLAN 4
- reported hx of Takotsubo, hospitalized with reported EF 35%   - TTE Echo - reported hx of Takotsubo, hospitalized with reported EF 35%   - TTE Echo: EF 56%, concentric LVH, nl LVF

## 2023-06-06 NOTE — DISCHARGE NOTE PROVIDER - NSDCCPTREATMENT_GEN_ALL_CORE_FT
PRINCIPAL PROCEDURE  Procedure: Transthoracic echocardiogram  Findings and Treatment: 1. Increased relative wall thickness with normal left  ventricular mass index, consistent with concentric left  ventricular remodeling.  2. Endocardium not well visualized; grossly normal left  ventricular systolic function.  3. The right ventricle is not well visualized.

## 2023-06-06 NOTE — DIETITIAN INITIAL EVALUATION ADULT - OTHER INFO
43 year old female with a PMH of PCOS, T2DM presenting to the ED with nausea and vomiting x10 episodes and dizziness likely from hyperglycemia due to uncontrolled diabetes mellitus in the setting of medication non-compliance per chart.    Patient reporting fair appetite. Food preferences reviewed. States feeling nauseous, ordered for Zofran PRN. No chewing/swallowing difficulties reported. Has no food allergies. Patient unsure of UBW. ABW is 156.5 lbs. (6/4) per chart, will monitor. No edema or pressure injuries noted per RN flow sheet.    Patient refused diabetes nutrition therapy education at this time, stated "I think I have it handled". Provided consistent carbohydrate handout and notified RD remains available as needed.

## 2023-06-06 NOTE — DIETITIAN INITIAL EVALUATION ADULT - PROBLEM SELECTOR PLAN 2
reported hx of syncope, unwilling to provide more information due to not feeling well, reports bladder/bowel incontinence and LOC though no confusion  last syncope episode reported 4 months prior   ECG- QTC ok  CTM

## 2023-06-06 NOTE — DISCHARGE NOTE PROVIDER - NSDCCPCAREPLAN_GEN_ALL_CORE_FT
PRINCIPAL DISCHARGE DIAGNOSIS  Diagnosis: Diabetes mellitus with hyperglycemia  Assessment and Plan of Treatment: You presented with nausea, vomiting and abdominal pain likely from uncontrolled diabetes. Your A1c- which is a 3month average of your glucose was very high (>10). The Endocrine experts recommended long and short acting insulin which were sent to your pharmacy. Please also follow up with primary care doctor and get your kidneys, eyes, and feet checked out (frequently affected in diabetes). Please return to the emergency room with any concerning symptoms.      SECONDARY DISCHARGE DIAGNOSES  Diagnosis: Hypertension  Assessment and Plan of Treatment: You presented with high blood pressure. You were started on two medications, Losartan and Amlodipine. Your heart ultrasound showed grossly normal function of the left side of the heart but thickening of the heart muscle possibly from uncontrolled blood pressure. Please follow your primary doctor outpatient for further management.    Diagnosis: DVT (deep venous thrombosis)  Assessment and Plan of Treatment: You have a history of blood clots in your legs and lung. You had an ultrasound of the legs that didn't show blood clots. Please continue ambulation and folllow up with your primary doctor.     PRINCIPAL DISCHARGE DIAGNOSIS  Diagnosis: Diabetes mellitus with hyperglycemia  Assessment and Plan of Treatment: You presented with nausea, vomiting and abdominal pain likely from uncontrolled diabetes. Your A1c- which is a 3month average of your glucose was very high (>10). The Endocrine experts recommended long and short acting insulin which were sent to your pharmacy. Please also follow up with primary care doctor and get your kidneys, eyes, and feet checked out (frequently affected in diabetes). Please return to the emergency room with any concerning symptoms.      SECONDARY DISCHARGE DIAGNOSES  Diagnosis: Hypertension  Assessment and Plan of Treatment: You presented with high blood pressure. You were started on Losartan. Your heart ultrasound showed grossly normal function of the left side of the heart but thickening of the heart muscle possibly from uncontrolled blood pressure. Please follow your primary doctor outpatient for further management.    Diagnosis: DVT (deep venous thrombosis)  Assessment and Plan of Treatment: You have a history of blood clots in your legs and lung. You had an ultrasound of the legs that didn't show blood clots. Please continue ambulation and folllow up with your primary doctor.    Diagnosis: Hyponatremia  Assessment and Plan of Treatment: Your sodium was low on admission. Urine studies showed this may be from a syndrome with "high ADH". We wanted to do a workup but you are choosing to leave against medical advise. Please seek further care with your primary doctor and return to the emergency room with concerning symtoms like confusion or dizziness.

## 2023-06-06 NOTE — PROGRESS NOTE ADULT - PROBLEM SELECTOR PLAN 1
Hx of T2DM, uncontrolled with hyperglycemia (states usually reads in the 300s)  Was offered an insulin pump in the past but declined  - unclear why she wasn't offered oral regimen in the past- she was just offered metformin but got upset stomach and never offered anything else   - POCG 300s on admission, no AG, BHB 0.5, small urinary ketones, normal pH- not in DKA  - HCG negative  - on Novolog 3U TID at home, not on long acting, not on oral, reports insurance hurdles as well  - 2/p 2L LR bolus    Plan:   Insulin Basal/Bolus regimen at .3-->.4 of body weight  Zofran/pepcid  LR maintenance fluids  Endo consult for further management  BMP, VBG Q12H for now Hx of T2DM, uncontrolled with hyperglycemia (states usually reads in the 300s)  Was offered an insulin pump in the past but declined  - unclear why she wasn't offered oral regimen in the past- she was just offered metformin but got upset stomach and never offered anything else   - POCG 300s on admission, no AG, BHB 0.5, small urinary ketones, normal pH- not in DKA  - HCG negative  - on Novolog 3U TID at home, not on long acting, not on oral, reports insurance hurdles as well  - 2/p 2L LR bolus    Plan:   Insulin Basal/Bolus regimen, Lantus 14QHS, Lispro 5TID per Endocrine  Zofran/pepcid  LR maintenance fluids  Endo consult for further management: BRITTNI, Zinc8 workup pending, but likely T2DM  BMP, VBG Q12H for now  - insulin education and teaching, supplies to be sent to [facility]

## 2023-06-06 NOTE — PROGRESS NOTE ADULT - PROBLEM SELECTOR PLAN 2
reported hx of syncope, unwilling to provide more information due to not feeling well, reports bladder/bowel incontinence and LOC though no confusion  last syncope episode reported 4 months prior   ECG- QTC ok  - TTE Echo   CTM reported hx of syncope, unwilling to provide more information due to not feeling well, reports bladder/bowel incontinence and LOC though no confusion  last syncope episode reported 4 months prior   ECG- QTC ok  - TTE Echo: EF 56%, concentric LVH, nl LVF  CTM

## 2023-06-06 NOTE — DIETITIAN INITIAL EVALUATION ADULT - PERTINENT MEDS FT
MEDICATIONS  (STANDING):  amLODIPine   Tablet 5 milliGRAM(s) Oral daily  atorvastatin 40 milliGRAM(s) Oral at bedtime  dextrose 5%. 1000 milliLiter(s) (50 mL/Hr) IV Continuous <Continuous>  dextrose 5%. 1000 milliLiter(s) (100 mL/Hr) IV Continuous <Continuous>  dextrose 50% Injectable 25 Gram(s) IV Push once  dextrose 50% Injectable 25 Gram(s) IV Push once  dextrose 50% Injectable 12.5 Gram(s) IV Push once  famotidine    Tablet 20 milliGRAM(s) Oral two times a day  glucagon  Injectable 1 milliGRAM(s) IntraMuscular once  heparin   Injectable 5000 Unit(s) SubCutaneous every 8 hours  insulin glargine Injectable (LANTUS) 14 Unit(s) SubCutaneous at bedtime  insulin lispro (ADMELOG) corrective regimen sliding scale   SubCutaneous three times a day before meals  insulin lispro (ADMELOG) corrective regimen sliding scale   SubCutaneous at bedtime  insulin lispro Injectable (ADMELOG) 5 Unit(s) SubCutaneous three times a day before meals  lactated ringers. 1000 milliLiter(s) (75 mL/Hr) IV Continuous <Continuous>  losartan 100 milliGRAM(s) Oral daily    MEDICATIONS  (PRN):  acetaminophen     Tablet .. 650 milliGRAM(s) Oral every 6 hours PRN Temp greater or equal to 38C (100.4F), Mild Pain (1 - 3)  aluminum hydroxide/magnesium hydroxide/simethicone Suspension 30 milliLiter(s) Oral every 6 hours PRN Dyspepsia  dextrose Oral Gel 15 Gram(s) Oral once PRN Blood Glucose LESS THAN 70 milliGRAM(s)/deciliter  melatonin 3 milliGRAM(s) Oral at bedtime PRN Insomnia  ondansetron Injectable 4 milliGRAM(s) IV Push every 8 hours PRN Nausea and/or Vomiting

## 2023-06-06 NOTE — PROGRESS NOTE ADULT - PROBLEM SELECTOR PLAN 3
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

## 2023-06-06 NOTE — DIETITIAN INITIAL EVALUATION ADULT - PERTINENT LABORATORY DATA
06-06    131<L>  |  93<L>  |  19  ----------------------------<  110<H>  3.2<L>   |  23  |  0.49<L>    Ca    9.3      06 Jun 2023 06:10  Phos  2.8     06-06  Mg     2.00     06-06    TPro  8.0  /  Alb  4.4  /  TBili  0.6  /  DBili  x   /  AST  13  /  ALT  14  /  AlkPhos  96  06-05  POCT Blood Glucose.: 77 mg/dL (06-06-23 @ 14:34)  A1C with Estimated Average Glucose Result: 12.8 % (06-04-23 @ 10:38)

## 2023-06-06 NOTE — DISCHARGE NOTE PROVIDER - NSDCFUADDAPPT_GEN_ALL_CORE_FT
APPTS ARE READY TO BE MADE: [x ] YES    Best Family or Patient Contact (if needed):    Additional Information about above appointments (if needed):    1:   2:   3:     Other comments or requests:    APPTS ARE READY TO BE MADE: [x ] YES    Best Family or Patient Contact (if needed):    Additional Information about above appointments (if needed):    1:   2:   3:     Other comments or requests:     Patient advised they currently have a specialist that they will follow up with in her hometown of [place], [place]

## 2023-06-07 ENCOUNTER — TRANSCRIPTION ENCOUNTER (OUTPATIENT)
Age: 43
End: 2023-06-07

## 2023-06-07 VITALS
RESPIRATION RATE: 18 BRPM | DIASTOLIC BLOOD PRESSURE: 104 MMHG | TEMPERATURE: 98 F | SYSTOLIC BLOOD PRESSURE: 156 MMHG | OXYGEN SATURATION: 100 % | HEART RATE: 100 BPM

## 2023-06-07 DIAGNOSIS — E87.1 HYPO-OSMOLALITY AND HYPONATREMIA: ICD-10-CM

## 2023-06-07 DIAGNOSIS — R82.90 UNSPECIFIED ABNORMAL FINDINGS IN URINE: ICD-10-CM

## 2023-06-07 LAB
-  AMPICILLIN: SIGNIFICANT CHANGE UP
-  CIPROFLOXACIN: SIGNIFICANT CHANGE UP
-  LEVOFLOXACIN: SIGNIFICANT CHANGE UP
-  NITROFURANTOIN: SIGNIFICANT CHANGE UP
-  TETRACYCLINE: SIGNIFICANT CHANGE UP
-  VANCOMYCIN: SIGNIFICANT CHANGE UP
ALBUMIN SERPL ELPH-MCNC: 3.9 G/DL — SIGNIFICANT CHANGE UP (ref 3.3–5)
ALP SERPL-CCNC: 86 U/L — SIGNIFICANT CHANGE UP (ref 40–120)
ALT FLD-CCNC: 13 U/L — SIGNIFICANT CHANGE UP (ref 4–33)
ANION GAP SERPL CALC-SCNC: 14 MMOL/L — SIGNIFICANT CHANGE UP (ref 7–14)
ANION GAP SERPL CALC-SCNC: 15 MMOL/L — HIGH (ref 7–14)
APPEARANCE UR: ABNORMAL
AST SERPL-CCNC: 15 U/L — SIGNIFICANT CHANGE UP (ref 4–32)
BILIRUB SERPL-MCNC: 0.8 MG/DL — SIGNIFICANT CHANGE UP (ref 0.2–1.2)
BILIRUB UR-MCNC: NEGATIVE — SIGNIFICANT CHANGE UP
BUN SERPL-MCNC: 15 MG/DL — SIGNIFICANT CHANGE UP (ref 7–23)
BUN SERPL-MCNC: 15 MG/DL — SIGNIFICANT CHANGE UP (ref 7–23)
CALCIUM SERPL-MCNC: 9.2 MG/DL — SIGNIFICANT CHANGE UP (ref 8.4–10.5)
CALCIUM SERPL-MCNC: 9.3 MG/DL — SIGNIFICANT CHANGE UP (ref 8.4–10.5)
CHLORIDE SERPL-SCNC: 90 MMOL/L — LOW (ref 98–107)
CHLORIDE SERPL-SCNC: 91 MMOL/L — LOW (ref 98–107)
CO2 SERPL-SCNC: 22 MMOL/L — SIGNIFICANT CHANGE UP (ref 22–31)
CO2 SERPL-SCNC: 22 MMOL/L — SIGNIFICANT CHANGE UP (ref 22–31)
COLOR SPEC: SIGNIFICANT CHANGE UP
CREAT ?TM UR-MCNC: 71 MG/DL — SIGNIFICANT CHANGE UP
CREAT SERPL-MCNC: 0.46 MG/DL — LOW (ref 0.5–1.3)
CREAT SERPL-MCNC: 0.58 MG/DL — SIGNIFICANT CHANGE UP (ref 0.5–1.3)
CULTURE RESULTS: SIGNIFICANT CHANGE UP
DIFF PNL FLD: NEGATIVE — SIGNIFICANT CHANGE UP
EGFR: 116 ML/MIN/1.73M2 — SIGNIFICANT CHANGE UP
EGFR: 122 ML/MIN/1.73M2 — SIGNIFICANT CHANGE UP
GLUCOSE BLDC GLUCOMTR-MCNC: 102 MG/DL — HIGH (ref 70–99)
GLUCOSE BLDC GLUCOMTR-MCNC: 129 MG/DL — HIGH (ref 70–99)
GLUCOSE SERPL-MCNC: 103 MG/DL — HIGH (ref 70–99)
GLUCOSE SERPL-MCNC: 126 MG/DL — HIGH (ref 70–99)
GLUCOSE UR QL: NEGATIVE — SIGNIFICANT CHANGE UP
HCT VFR BLD CALC: 43.2 % — SIGNIFICANT CHANGE UP (ref 34.5–45)
HGB BLD-MCNC: 15 G/DL — SIGNIFICANT CHANGE UP (ref 11.5–15.5)
KETONES UR-MCNC: ABNORMAL
LEUKOCYTE ESTERASE UR-ACNC: ABNORMAL
MAGNESIUM SERPL-MCNC: 2 MG/DL — SIGNIFICANT CHANGE UP (ref 1.6–2.6)
MAGNESIUM SERPL-MCNC: 2 MG/DL — SIGNIFICANT CHANGE UP (ref 1.6–2.6)
MCHC RBC-ENTMCNC: 29.3 PG — SIGNIFICANT CHANGE UP (ref 27–34)
MCHC RBC-ENTMCNC: 34.7 GM/DL — SIGNIFICANT CHANGE UP (ref 32–36)
MCV RBC AUTO: 84.4 FL — SIGNIFICANT CHANGE UP (ref 80–100)
METHOD TYPE: SIGNIFICANT CHANGE UP
NITRITE UR-MCNC: NEGATIVE — SIGNIFICANT CHANGE UP
NRBC # BLD: 0 /100 WBCS — SIGNIFICANT CHANGE UP (ref 0–0)
NRBC # FLD: 0 K/UL — SIGNIFICANT CHANGE UP (ref 0–0)
ORGANISM # SPEC MICROSCOPIC CNT: SIGNIFICANT CHANGE UP
ORGANISM # SPEC MICROSCOPIC CNT: SIGNIFICANT CHANGE UP
OSMOLALITY UR: 581 MOSM/KG — SIGNIFICANT CHANGE UP (ref 50–1200)
PH UR: 7 — SIGNIFICANT CHANGE UP (ref 5–8)
PHOSPHATE SERPL-MCNC: 3 MG/DL — SIGNIFICANT CHANGE UP (ref 2.5–4.5)
PHOSPHATE SERPL-MCNC: 3 MG/DL — SIGNIFICANT CHANGE UP (ref 2.5–4.5)
PLATELET # BLD AUTO: 290 K/UL — SIGNIFICANT CHANGE UP (ref 150–400)
POTASSIUM SERPL-MCNC: 3.7 MMOL/L — SIGNIFICANT CHANGE UP (ref 3.5–5.3)
POTASSIUM SERPL-MCNC: 4.5 MMOL/L — SIGNIFICANT CHANGE UP (ref 3.5–5.3)
POTASSIUM SERPL-SCNC: 3.7 MMOL/L — SIGNIFICANT CHANGE UP (ref 3.5–5.3)
POTASSIUM SERPL-SCNC: 4.5 MMOL/L — SIGNIFICANT CHANGE UP (ref 3.5–5.3)
POTASSIUM UR-SCNC: 30.4 MMOL/L — SIGNIFICANT CHANGE UP
PROT ?TM UR-MCNC: 70 MG/DL — SIGNIFICANT CHANGE UP
PROT SERPL-MCNC: 7.6 G/DL — SIGNIFICANT CHANGE UP (ref 6–8.3)
PROT UR-MCNC: ABNORMAL
PROT/CREAT UR-RTO: 1 RATIO — HIGH (ref 0–0.2)
RBC # BLD: 5.12 M/UL — SIGNIFICANT CHANGE UP (ref 3.8–5.2)
RBC # FLD: 12.4 % — SIGNIFICANT CHANGE UP (ref 10.3–14.5)
SODIUM SERPL-SCNC: 127 MMOL/L — LOW (ref 135–145)
SODIUM SERPL-SCNC: 127 MMOL/L — LOW (ref 135–145)
SODIUM UR-SCNC: 123 MMOL/L — SIGNIFICANT CHANGE UP
SP GR SPEC: 1.02 — SIGNIFICANT CHANGE UP (ref 1.01–1.05)
SPECIMEN SOURCE: SIGNIFICANT CHANGE UP
TSH SERPL-MCNC: 0.61 UIU/ML — SIGNIFICANT CHANGE UP (ref 0.27–4.2)
UROBILINOGEN FLD QL: SIGNIFICANT CHANGE UP
UUN UR-MCNC: 695 MG/DL — SIGNIFICANT CHANGE UP
WBC # BLD: 7.09 K/UL — SIGNIFICANT CHANGE UP (ref 3.8–10.5)
WBC # FLD AUTO: 7.09 K/UL — SIGNIFICANT CHANGE UP (ref 3.8–10.5)

## 2023-06-07 PROCEDURE — 93010 ELECTROCARDIOGRAM REPORT: CPT

## 2023-06-07 PROCEDURE — 99239 HOSP IP/OBS DSCHRG MGMT >30: CPT

## 2023-06-07 PROCEDURE — 99232 SBSQ HOSP IP/OBS MODERATE 35: CPT

## 2023-06-07 RX ORDER — INSULIN LISPRO 100/ML
VIAL (ML) SUBCUTANEOUS
Refills: 0
Start: 2023-06-07

## 2023-06-07 RX ORDER — ISOPROPYL ALCOHOL, BENZOCAINE .7; .06 ML/ML; ML/ML
0 SWAB TOPICAL
Qty: 100 | Refills: 1
Start: 2023-06-07

## 2023-06-07 RX ORDER — INSULIN LISPRO 100/ML
2 VIAL (ML) SUBCUTANEOUS
Qty: 3 | Refills: 0
Start: 2023-06-07

## 2023-06-07 RX ORDER — INSULIN GLARGINE 100 [IU]/ML
12 INJECTION, SOLUTION SUBCUTANEOUS
Qty: 5 | Refills: 0
Start: 2023-06-07

## 2023-06-07 RX ORDER — INSULIN GLARGINE 100 [IU]/ML
12 INJECTION, SOLUTION SUBCUTANEOUS AT BEDTIME
Refills: 0 | Status: DISCONTINUED | OUTPATIENT
Start: 2023-06-07 | End: 2023-06-07

## 2023-06-07 RX ORDER — INSULIN LISPRO 100/ML
2 VIAL (ML) SUBCUTANEOUS
Qty: 0 | Refills: 0 | DISCHARGE
Start: 2023-06-07

## 2023-06-07 RX ORDER — SODIUM CHLORIDE 9 MG/ML
1000 INJECTION, SOLUTION INTRAVENOUS
Refills: 0 | Status: DISCONTINUED | OUTPATIENT
Start: 2023-06-07 | End: 2023-06-07

## 2023-06-07 RX ORDER — INSULIN LISPRO 100/ML
2 VIAL (ML) SUBCUTANEOUS
Refills: 0 | Status: DISCONTINUED | OUTPATIENT
Start: 2023-06-07 | End: 2023-06-07

## 2023-06-07 RX ORDER — INSULIN GLARGINE 100 [IU]/ML
12 INJECTION, SOLUTION SUBCUTANEOUS
Qty: 0 | Refills: 0 | DISCHARGE
Start: 2023-06-07

## 2023-06-07 RX ORDER — SODIUM CHLORIDE 9 MG/ML
1 INJECTION INTRAMUSCULAR; INTRAVENOUS; SUBCUTANEOUS THREE TIMES A DAY
Refills: 0 | Status: DISCONTINUED | OUTPATIENT
Start: 2023-06-07 | End: 2023-06-07

## 2023-06-07 RX ORDER — INSULIN GLARGINE 100 [IU]/ML
12 INJECTION, SOLUTION SUBCUTANEOUS
Qty: 3 | Refills: 0
Start: 2023-06-07

## 2023-06-07 RX ADMIN — Medication 2 UNIT(S): at 13:07

## 2023-06-07 RX ADMIN — Medication 3 UNIT(S): at 08:51

## 2023-06-07 RX ADMIN — SODIUM CHLORIDE 75 MILLILITER(S): 9 INJECTION, SOLUTION INTRAVENOUS at 08:52

## 2023-06-07 RX ADMIN — FAMOTIDINE 20 MILLIGRAM(S): 10 INJECTION INTRAVENOUS at 05:05

## 2023-06-07 RX ADMIN — HEPARIN SODIUM 5000 UNIT(S): 5000 INJECTION INTRAVENOUS; SUBCUTANEOUS at 05:05

## 2023-06-07 RX ADMIN — LOSARTAN POTASSIUM 100 MILLIGRAM(S): 100 TABLET, FILM COATED ORAL at 05:05

## 2023-06-07 RX ADMIN — AMLODIPINE BESYLATE 5 MILLIGRAM(S): 2.5 TABLET ORAL at 05:05

## 2023-06-07 RX ADMIN — ONDANSETRON 4 MILLIGRAM(S): 8 TABLET, FILM COATED ORAL at 05:05

## 2023-06-07 NOTE — PROGRESS NOTE ADULT - PROBLEM SELECTOR PLAN 1
Hx of T2DM, uncontrolled with hyperglycemia (states usually reads in the 300s)  Was offered an insulin pump in the past but declined  - unclear why she wasn't offered oral regimen in the past- she was just offered metformin but got upset stomach and never offered anything else   - POCG 300s on admission, no AG, BHB 0.5, small urinary ketones, normal pH- not in DKA  - HCG negative  - on Novolog 3U TID at home, not on long acting, not on oral, reports insurance hurdles as well  - 2/p 2L LR bolus    Plan:   Insulin Basal/Bolus regimen, Lantus 14QHS, Lispro 5TID per Endocrine  Zofran/pepcid  LR maintenance fluids  Endo consult for further management: BRITTNI, Zinc8 workup pending, but likely T2DM  BMP, VBG Q12H for now  - insulin education and teaching, supplies to be sent to [facility] Hx of T2DM, uncontrolled with hyperglycemia (states usually reads in the 300s)  Was offered an insulin pump in the past but declined  - unclear why she wasn't offered oral regimen in the past- she was just offered metformin but got upset stomach and never offered anything else   - POCG 300s on admission, no AG, BHB 0.5, small urinary ketones, normal pH- not in DKA  - HCG negative  - on Novolog 3U TID at home, not on long acting, not on oral, reports insurance hurdles as well  - 2/p 2L LR bolus    Plan:   Insulin Basal/Bolus regimen, Lantus 14QHS---> 12QHS, Lispro 5TID--->2TID per Endocrine  Zofran/pepcid  LR maintenance fluids  Endo consult for further management: BRITTNI, Zinc8 workup pending, but likely T2DM  BMP, VBG Q12H for now  -encouraged patient to only take short acting pre-meal insulin if adequate PO intake  - insulin education and teaching, supplies sent to pharmacy in [place], [facility] [place] can afford 50 dollars to pay for them

## 2023-06-07 NOTE — DISCHARGE NOTE NURSING/CASE MANAGEMENT/SOCIAL WORK - PATIENT PORTAL LINK FT
You can access the FollowMyHealth Patient Portal offered by [facility] by registering at the following website: [email or web address]. By joining [email or web address]’s FollowMyHealth portal, you will also be able to view your health information using other applications (apps) compatible with our system.

## 2023-06-07 NOTE — PROVIDER CONTACT NOTE (OTHER) - RECOMMENDATIONS
IV bolus. MD will come to bedside and assess.
MD Jose C Montalvo notified.
Continue to Monitor
MD Jose C Montalvo notified.
MD Jose C Montalvo notified.

## 2023-06-07 NOTE — PROGRESS NOTE ADULT - SUBJECTIVE AND OBJECTIVE BOX
INTERVAL HPI/OVERNIGHT EVENTS:    SUBJECTIVE: Patient seen and examined at bedside. No acute overnight events. Decreased vomiting, still nausea, generalized weakness. Denied abdominal pain in AM.     OBJECTIVE:    VITAL SIGNS:  ICU Vital Signs Last 24 Hrs  T(C): 37.1 (2023 22:52), Max: 37.1 (2023 14:03)  T(F): 98.8 (2023 22:52), Max: 98.8 (2023 14:03)  HR: 108 (2023 22:52) (88 - 108)  BP: 150/85 (2023 22:52) (148/98 - 182/113)  BP(mean): --  ABP: --  ABP(mean): --  RR: 16 (2023 22:52) (16 - 18)  SpO2: 98% (2023 22:52) (98% - 100%)    O2 Parameters below as of 2023 22:52  Patient On (Oxygen Delivery Method): room air            Plateau pressure:   P/F ratio:     CAPILLARY BLOOD GLUCOSE      POCT Blood Glucose.: 262 mg/dL (2023 22:36)    PHYSICAL EXAM:    General: NAD  HEENT: clear conjunctiva  Neck: supple  Respiratory: CTA b/l  Cardiovascular: +S1/S2; RRR  Abdomen: soft, NT/ND; +BS x4  Extremities: 2+ peripheral pulses b/l; no LE edema  Skin: normal color and turgor; no rash  Neurological: AOx3    MEDICATIONS:  MEDICATIONS  (STANDING):  dextrose 5%. 1000 milliLiter(s) (100 mL/Hr) IV Continuous <Continuous>  dextrose 5%. 1000 milliLiter(s) (50 mL/Hr) IV Continuous <Continuous>  dextrose 50% Injectable 25 Gram(s) IV Push once  dextrose 50% Injectable 12.5 Gram(s) IV Push once  dextrose 50% Injectable 25 Gram(s) IV Push once  famotidine    Tablet 20 milliGRAM(s) Oral two times a day  glucagon  Injectable 1 milliGRAM(s) IntraMuscular once  heparin   Injectable 5000 Unit(s) SubCutaneous every 8 hours  insulin glargine Injectable (LANTUS) 10 Unit(s) SubCutaneous at bedtime  insulin lispro (ADMELOG) corrective regimen sliding scale   SubCutaneous three times a day before meals  insulin lispro (ADMELOG) corrective regimen sliding scale   SubCutaneous at bedtime  insulin lispro Injectable (ADMELOG) 3 Unit(s) SubCutaneous three times a day before meals  lactated ringers. 1000 milliLiter(s) (75 mL/Hr) IV Continuous <Continuous>  losartan 50 milliGRAM(s) Oral daily    MEDICATIONS  (PRN):  acetaminophen     Tablet .. 650 milliGRAM(s) Oral every 6 hours PRN Temp greater or equal to 38C (100.4F), Mild Pain (1 - 3)  dextrose Oral Gel 15 Gram(s) Oral once PRN Blood Glucose LESS THAN 70 milliGRAM(s)/deciliter  melatonin 3 milliGRAM(s) Oral at bedtime PRN Insomnia  ondansetron Injectable 4 milliGRAM(s) IV Push every 8 hours PRN Nausea and/or Vomiting      LABS:                        14.2   8.21  )-----------( 315      ( 2023 06:15 )             40.5     06-05    128<L>  |  90<L>  |  15  ----------------------------<  245<H>  3.9   |  23  |  0.47<L>    Ca    9.5      2023 06:15  Phos  2.8     06-05  Mg     1.50     06-05    TPro  8.0  /  Alb  4.4  /  TBili  0.6  /  DBili  x   /  AST  13  /  ALT  14  /  AlkPhos  96  06-05    PT/INR - ( 2023 06:15 )   PT: 13.4 sec;   INR: 1.15 ratio         PTT - ( 2023 06:15 )  PTT:24.4 sec  Urinalysis Basic - ( 2023 12:27 )    Color: Colorless / Appearance: Clear / S.017 / pH: x  Gluc: x / Ketone: Small  / Bili: Negative / Urobili: <2 mg/dL   Blood: x / Protein: 100 mg/dL / Nitrite: Negative   Leuk Esterase: Negative / RBC: 2 /HPF / WBC 2 /HPF   Sq Epi: x / Non Sq Epi: x / Bacteria: Negative        RADIOLOGY & ADDITIONAL TESTS: Reviewed.     INTERVAL HPI/OVERNIGHT EVENTS:    SUBJECTIVE: Patient seen and examined at bedside. ON, patient had multiple episodes of diarrhea and GI PCR was sent, also episode of hypotension requiring LR bolus. In addition, patient had low FSG possibly in setting of decreased PO intake and ?gastroparesis.     OBJECTIVE:    VITAL SIGNS:  ICU Vital Signs Last 24 Hrs  T(C): 37.1 (2023 22:52), Max: 37.1 (2023 14:03)  T(F): 98.8 (2023 22:52), Max: 98.8 (2023 14:03)  HR: 108 (2023 22:52) (88 - 108)  BP: 150/85 (2023 22:52) (148/98 - 182/113)  BP(mean): --  ABP: --  ABP(mean): --  RR: 16 (2023 22:52) (16 - 18)  SpO2: 98% (2023 22:52) (98% - 100%)    O2 Parameters below as of 2023 22:52  Patient On (Oxygen Delivery Method): room air            Plateau pressure:   P/F ratio:     CAPILLARY BLOOD GLUCOSE      POCT Blood Glucose.: 262 mg/dL (2023 22:36)    PHYSICAL EXAM:    General: NAD  HEENT: clear conjunctiva  Neck: supple  Respiratory: CTA b/l  Cardiovascular: +S1/S2; RRR  Abdomen: soft, NT/ND; +BS x4  Extremities: 2+ peripheral pulses b/l; no LE edema  Skin: normal color and turgor; no rash  Neurological: AOx3    MEDICATIONS:  MEDICATIONS  (STANDING):  dextrose 5%. 1000 milliLiter(s) (100 mL/Hr) IV Continuous <Continuous>  dextrose 5%. 1000 milliLiter(s) (50 mL/Hr) IV Continuous <Continuous>  dextrose 50% Injectable 25 Gram(s) IV Push once  dextrose 50% Injectable 12.5 Gram(s) IV Push once  dextrose 50% Injectable 25 Gram(s) IV Push once  famotidine    Tablet 20 milliGRAM(s) Oral two times a day  glucagon  Injectable 1 milliGRAM(s) IntraMuscular once  heparin   Injectable 5000 Unit(s) SubCutaneous every 8 hours  insulin glargine Injectable (LANTUS) 10 Unit(s) SubCutaneous at bedtime  insulin lispro (ADMELOG) corrective regimen sliding scale   SubCutaneous three times a day before meals  insulin lispro (ADMELOG) corrective regimen sliding scale   SubCutaneous at bedtime  insulin lispro Injectable (ADMELOG) 3 Unit(s) SubCutaneous three times a day before meals  lactated ringers. 1000 milliLiter(s) (75 mL/Hr) IV Continuous <Continuous>  losartan 50 milliGRAM(s) Oral daily    MEDICATIONS  (PRN):  acetaminophen     Tablet .. 650 milliGRAM(s) Oral every 6 hours PRN Temp greater or equal to 38C (100.4F), Mild Pain (1 - 3)  dextrose Oral Gel 15 Gram(s) Oral once PRN Blood Glucose LESS THAN 70 milliGRAM(s)/deciliter  melatonin 3 milliGRAM(s) Oral at bedtime PRN Insomnia  ondansetron Injectable 4 milliGRAM(s) IV Push every 8 hours PRN Nausea and/or Vomiting      LABS:                        14.2   8.21  )-----------( 315      ( 2023 06:15 )             40.5     06-05    128<L>  |  90<L>  |  15  ----------------------------<  245<H>  3.9   |  23  |  0.47<L>    Ca    9.5      2023 06:15  Phos  2.8     06-05  Mg     1.50     06-05    TPro  8.0  /  Alb  4.4  /  TBili  0.6  /  DBili  x   /  AST  13  /  ALT  14  /  AlkPhos  96  06-05    PT/INR - ( 2023 06:15 )   PT: 13.4 sec;   INR: 1.15 ratio         PTT - ( 2023 06:15 )  PTT:24.4 sec  Urinalysis Basic - ( 2023 12:27 )    Color: Colorless / Appearance: Clear / S.017 / pH: x  Gluc: x / Ketone: Small  / Bili: Negative / Urobili: <2 mg/dL   Blood: x / Protein: 100 mg/dL / Nitrite: Negative   Leuk Esterase: Negative / RBC: 2 /HPF / WBC 2 /HPF   Sq Epi: x / Non Sq Epi: x / Bacteria: Negative        RADIOLOGY & ADDITIONAL TESTS: Reviewed.

## 2023-06-07 NOTE — PROVIDER CONTACT NOTE (OTHER) - BACKGROUND
43 year old female. admit diagnosis nausea and vomiting. PMH poorly controlled type 2 DM, HTN, and DVT
Patient admitted for nausea and vomiting , with pmh of type 2 diabetes, HTN, and DVT.
Patient admitted for nausea and vomiting , with pmh of type 2 diabetes, HTN, and DVT.

## 2023-06-07 NOTE — PROGRESS NOTE ADULT - PROBLEM SELECTOR PLAN 4
- reported hx of Takotsubo, hospitalized with reported EF 35%   - TTE Echo: EF 56%, concentric LVH, nl LVF HTN to 180s on admission   Losartan, home med---> increased to 100mg  further BP control as needed--> if no response to increase in Losartan, can consider Amlodipine  - dc'd amlodipine 6/7 in setting of hypotensive episode

## 2023-06-07 NOTE — PROGRESS NOTE ADULT - SUBJECTIVE AND OBJECTIVE BOX
Chief Complaint: DM 2    History: Patient seen at bedside. Reports she is eating meals, denies n/v, denies s/s of hypoglycemia    MEDICATIONS  (STANDING):  atorvastatin 40 milliGRAM(s) Oral at bedtime  dextrose 5%. 1000 milliLiter(s) (50 mL/Hr) IV Continuous <Continuous>  dextrose 5%. 1000 milliLiter(s) (100 mL/Hr) IV Continuous <Continuous>  dextrose 50% Injectable 25 Gram(s) IV Push once  dextrose 50% Injectable 12.5 Gram(s) IV Push once  dextrose 50% Injectable 25 Gram(s) IV Push once  famotidine    Tablet 20 milliGRAM(s) Oral two times a day  glucagon  Injectable 1 milliGRAM(s) IntraMuscular once  heparin   Injectable 5000 Unit(s) SubCutaneous every 8 hours  insulin glargine Injectable (LANTUS) 12 Unit(s) SubCutaneous at bedtime  insulin lispro (ADMELOG) corrective regimen sliding scale   SubCutaneous three times a day before meals  insulin lispro (ADMELOG) corrective regimen sliding scale   SubCutaneous at bedtime  insulin lispro Injectable (ADMELOG) 2 Unit(s) SubCutaneous three times a day before meals  losartan 100 milliGRAM(s) Oral daily    MEDICATIONS  (PRN):  acetaminophen     Tablet .. 650 milliGRAM(s) Oral every 6 hours PRN Temp greater or equal to 38C (100.4F), Mild Pain (1 - 3)  aluminum hydroxide/magnesium hydroxide/simethicone Suspension 30 milliLiter(s) Oral every 6 hours PRN Dyspepsia  dextrose Oral Gel 15 Gram(s) Oral once PRN Blood Glucose LESS THAN 70 milliGRAM(s)/deciliter  melatonin 3 milliGRAM(s) Oral at bedtime PRN Insomnia  ondansetron Injectable 4 milliGRAM(s) IV Push every 8 hours PRN Nausea and/or Vomiting    No Known Allergies    Review of Systems:  Cardiovascular: No chest pain  Respiratory: No SOB  GI: No nausea, vomiting  Endocrine: no hypoglycemia     PHYSICAL EXAM:  VITALS: T(C): 36.9 (06-07-23 @ 14:04)  T(F): 98.4 (06-07-23 @ 14:04), Max: 98.8 (06-07-23 @ 05:00)  HR: 100 (06-07-23 @ 14:04) (87 - 100)  BP: 156/104 (06-07-23 @ 14:04) (85/63 - 172/101)  RR:  (16 - 18)  SpO2:  (99% - 100%)  Wt(kg): --  GENERAL: NAD  EYES: No proptosis, no lid lag, anicteric  HEENT:  Atraumatic, Normocephalic, moist mucous membranes  RESPIRATORY: unlabored respirations     CAPILLARY BLOOD GLUCOSE    POCT Blood Glucose.: 102 mg/dL (07 Jun 2023 12:16)  POCT Blood Glucose.: 129 mg/dL (07 Jun 2023 08:48)  POCT Blood Glucose.: 119 mg/dL (06 Jun 2023 21:06)  POCT Blood Glucose.: 73 mg/dL (06 Jun 2023 19:37)  POCT Blood Glucose.: 85 mg/dL (06 Jun 2023 17:44)  POCT Blood Glucose.: 77 mg/dL (06 Jun 2023 14:34)      06-07    127<L>  |  91<L>  |  15  ----------------------------<  126<H>  3.7   |  22  |  0.46<L>    eGFR: 122    Ca    9.2      06-07  Mg     2.00     06-07  Phos  3.0     06-07    TPro  7.6  /  Alb  3.9  /  TBili  0.8  /  DBili  x   /  AST  15  /  ALT  13  /  AlkPhos  86  06-07    A1C with Estimated Average Glucose Result: 12.8 % (06-04-23 @ 10:38)    Diet, Regular:   Consistent Carbohydrate No Snacks (CSTCHO) (06-05-23 @ 08:57) [Active]

## 2023-06-07 NOTE — PROGRESS NOTE ADULT - PROBLEM SELECTOR PLAN 6
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

## 2023-06-07 NOTE — PROGRESS NOTE ADULT - PROBLEM SELECTOR PLAN 5
HTN to 180s on admission   Losartan, home med---> increased to 100mg  further BP control as needed--> if no response to increase in Losartan, can consider Amlodipine reported hx of syncope, unwilling to provide more information due to not feeling well, reports bladder/bowel incontinence and LOC though no confusion  last syncope episode reported 4 months prior   ECG- QTC ok, poor R-wave progression, sinus   - TTE Echo: EF 56%, concentric LVH, nl LVF  CTM

## 2023-06-07 NOTE — PROVIDER CONTACT NOTE (OTHER) - ACTION/TREATMENT ORDERED:
MD Jose C Montalvo notified.
MD Jose C Montalvo notified.
MD notified and aware. No interventions needed. Continue to Monitor.
MD Jose C Montalvo notified. Amlodipine ordered and given.
MD notified and aware. IV bolus will be ordered. MD will come to bedside and assess.
no

## 2023-06-07 NOTE — DISCHARGE NOTE NURSING/CASE MANAGEMENT/SOCIAL WORK - NSDCFUADDAPPT_GEN_ALL_CORE_FT
APPTS ARE READY TO BE MADE: [x ] YES    Best Family or Patient Contact (if needed):    Additional Information about above appointments (if needed):    1:   2:   3:     Other comments or requests:     Patient advised they currently have a specialist that they will follow up with in her hometown of [place], [place]

## 2023-06-07 NOTE — PROVIDER CONTACT NOTE (OTHER) - ASSESSMENT
Patient is A&Otimes 4. No lethargic noted.  Patient denied headache or dizziness. VS: BP: 172/101, HR: 95, RR:17, SpO2:100%.  VS is performed after IV bolus.
patient A&O4. no acute distress noted.
Patient is A&Otimes 3. Lethargic. Patient denied headache or dizziness. VS: BP: 85/63, HR: 87, RR:16, SpO2:99%, and temp 97.9. Patient stated that 3 diarrhea during the day. Possible fluid loss from diarrhea.
patient A&O4. no acute distress noted.
patient A&O4. no acute distress noted.

## 2023-06-07 NOTE — PROGRESS NOTE ADULT - PROBLEM SELECTOR PLAN 8
Diet: CL diet, advance as tolerated  DVT: SQH  Dispo: pending clinical improvement, diabetes supplies to [facility]

## 2023-06-07 NOTE — PROGRESS NOTE ADULT - PROBLEM SELECTOR PLAN 2
reported hx of syncope, unwilling to provide more information due to not feeling well, reports bladder/bowel incontinence and LOC though no confusion  last syncope episode reported 4 months prior   ECG- QTC ok  - TTE Echo: EF 56%, concentric LVH, nl LVF  CTM - patient with hyponatremia to 127 on testing  - s/p LR bolus and maintenance, but worsened  - urine studies: UOsm 581, Dougie 123 c/w high ADH   - TSH ordered  - will need additional testing (including AM cortisol) outpatient

## 2023-06-07 NOTE — PROGRESS NOTE ADULT - PROBLEM SELECTOR PROBLEM 1
Diabetes mellitus with hyperglycemia

## 2023-06-07 NOTE — PROGRESS NOTE ADULT - PROBLEM SELECTOR PLAN 3
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

## 2023-06-07 NOTE — PROGRESS NOTE ADULT - ASSESSMENT
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 
43 year old female with poorly controlled T2DM, HTN and hyperlipidemia, presents with nausea, vomiting and severe hyperglycemia in setting of likely diagnosis of gastroparesis.     1. T2DM with hyperglycemia  A1c 12.8%  Home Regimen: None, off all meds/insulin prior to admission, was previously using basal/bolus insulin pens  Follow up BRITTNI and Zinc8 antibodies (ordered in ED) although more likely DM2 diagnosis given lack of DKA while off insulin.    While inpatient:   BG target 100-180 mg/dl  Continue Lantus 14 units SQ qHS  Low PO intake, decrease Admelog to 3 units SQ TID before meals (Hold if NPO/skips meal)   Continue Admelog LOW dose correctional scale before meals, low dose at bedtime   BG before meals and bedtime  Consistent carbohydrate diet   Carb consistent diet   RD consult  Re-educate/teach insulin (best if  is present)      Discharge Plan:   Basal/bolus insulin PENS with dose TBD   Would check coverage to see which brand of basal and bolus pens are covered. Options for basal are: Lantus, Tresiba, Touejo, Levemir, Semglee. Options for bolus are: Admelog, Novolog, Humalog, Apidra   Given her GI symptoms, would NOT be a good candidate for GLP-1 agonist or Metformin currently. Poor tolerance of Metformin the past   Please ensure patient is provided with glucometer, glucose test strips, lancets, alcohol swabs and insulin PEN needles   PCP to refer patient to endocrinologist in [place]     2. HTN  Aim BP <130/80  Currently on Losartan 50mg daily   If persistently elevated consider adding a second agent (ie dihydropyridine CCB)  Urinary ACR     3. Hyperlipidemia   Aim LDL <70  10 year ASCVD risk 5.3%  On Atorvastatin 40 mg daily  Will need to discuss contraception options as patient currently sexually active with significantly elevated HbA1c and on statin      4. Gastroparesis  Continue antiemetics  Target better glycemic control   Small meals – may benefit from dietician review   Has outpatient GI appointment      Ping Avila  Nurse Practitioner  Division of Endocrinology & Diabetes  In house pager #[phone]/long range pager #[phone]    If before 9AM or after 6PM, or on weekends/holidays, please call endocrine answering service for assistance ([phone]).  For nonurgent matters email [email or web address] for assistance. 
43 year old female with poorly controlled T2DM, HTN and hyperlipidemia, presents with nausea, vomiting and severe hyperglycemia in setting of likely diagnosis of gastroparesis.     1. T2DM with hyperglycemia  A1c 12.8%  Home Regimen: None, off all meds/insulin prior to admission, was previously using basal/bolus insulin pens  Follow up BRITTNI and Zinc8 antibodies (ordered in ED) although more likely DM2 diagnosis given lack of DKA while off insulin.    While inpatient:   BG target 100-180 mg/dl  Continue Lantus 14 units SQ qHS  Continue Admelog 3 units SQ TID before meals (Hold if NPO/skips meal)   Continue Admelog LOW dose correctional scale before meals, low dose at bedtime   BG before meals and bedtime  Consistent carbohydrate diet   Carb consistent diet   RD consult  Re-educate/teach insulin (best if  is present)      Discharge Plan:   Basal/bolus insulin PENS with dose TBD   Would check coverage to see which brand of basal and bolus pens are covered. Options for basal are: Lantus, Tresiba, Touejo, Levemir, Semglee. Options for bolus are: Admelog, Novolog, Humalog, Apidra   Given her GI symptoms, would NOT be a good candidate for GLP-1 agonist or Metformin currently. Poor tolerance of Metformin the past   Please ensure patient is provided with glucometer, glucose test strips, lancets, alcohol swabs and insulin PEN needles   PCP to refer patient to endocrinologist in [place]     2. HTN  Aim BP <130/80  Currently on Losartan 50mg daily   If persistently elevated consider adding a second agent (ie dihydropyridine CCB)  Urinary ACR     3. Hyperlipidemia   Aim LDL <70  10 year ASCVD risk 5.3%  On Atorvastatin 40 mg daily  Will need to discuss contraception options as patient currently sexually active with significantly elevated HbA1c and on statin      4. Gastroparesis  Continue antiemetics  Target better glycemic control   Small meals – may benefit from dietician review   Has outpatient GI appointment      Ping Avila  Nurse Practitioner  Division of Endocrinology & Diabetes  In house pager #[phone]/long range pager #[phone]    If before 9AM or after 6PM, or on weekends/holidays, please call endocrine answering service for assistance ([phone]).  For nonurgent matters email [email or web address] for assistance. 
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

## 2023-06-07 NOTE — PROGRESS NOTE ADULT - PROBLEM SELECTOR PLAN 7
- reported hx of Takotsubo, hospitalized with reported EF 35%   - TTE Echo: EF 56%, concentric LVH, nl LVF

## 2023-06-07 NOTE — DISCHARGE NOTE NURSING/CASE MANAGEMENT/SOCIAL WORK - NSDCPEFALRISK_GEN_ALL_CORE
For information on Fall & Injury Prevention, visit: [email or web address] OR  [email or web address] OR  https://www.cdc.gov/steadi/patient.html

## 2023-06-08 LAB — GAD65 AB SER-MCNC: 0 NMOL/L — SIGNIFICANT CHANGE UP

## 2023-06-12 LAB — ZINC TRANSPORTER 8 AB, RESULT: <15 U/ML — SIGNIFICANT CHANGE UP

## 2023-07-10 PROBLEM — Z00.00 ENCOUNTER FOR PREVENTIVE HEALTH EXAMINATION: Status: ACTIVE | Noted: 2023-07-10

## 2024-11-04 NOTE — DIETITIAN INITIAL EVALUATION ADULT - CALCULATED TO (G/KG)
Ambulatory Care Coordination Note     2024 1:50 PM     Patient Current Location:  [place]     This patient was received as a referral from Ambulatory Care Manager .    ACM contacted the patient by telephone. Verified name and  with patient as identifiers. Provided introduction to self, and explanation of the ACM role.   Patient accepted care management services at this time.          ACM: Erin Nick RN     Challenges to be reviewed by the provider   Additional needs identified to be addressed with provider Yes  Notified pcp that rpm is not covered under insurance               Method of communication with provider: staff message.    Has the patient been seen in the ED since your last call? no    Care Summary Note: ccm outreached to patient. Patient agreeable to ccm services. Discussed with patient that we contacted insurance and rpm is not covered. Patient states she would like to hold off. Discussed with patient to keep a log of blood pressures and we will review weekly. Patient agreeable. Ccm notified pcp that rpm is not covered by insurance. Patient states no questions or concerns at this time. Ccm discussed that I would outreach next week. Patient agreeable.     Offered patient enrollment in the Remote Patient Monitoring (RPM) program for in-home monitoring: Yes, but did not enroll at this time: declined to enroll in the program becauseinsurance does not cover .     Assessments Completed:       2024     1:43 PM   Amb Fall Risk Assessment and TUG Test   Do you feel unsteady or are you worried about falling?  no   2 or more falls in past year? no   Fall with injury in past year? no    ,   Ambulatory Care Coordination Assessment    Care Coordination Protocol  Referral from Primary Care Provider: No  Week 1 - Initial Assessment     Do you have all of your prescriptions and are they filled?: Yes  Barriers to medication adherence: None  Are you able to afford your medications?: No  How often  67.92